# Patient Record
Sex: FEMALE | Race: WHITE | NOT HISPANIC OR LATINO | Employment: FULL TIME | URBAN - NONMETROPOLITAN AREA
[De-identification: names, ages, dates, MRNs, and addresses within clinical notes are randomized per-mention and may not be internally consistent; named-entity substitution may affect disease eponyms.]

---

## 2017-06-12 ENCOUNTER — LAB (OUTPATIENT)
Dept: LAB | Facility: HOSPITAL | Age: 47
End: 2017-06-12

## 2017-06-12 ENCOUNTER — OFFICE VISIT (OUTPATIENT)
Dept: FAMILY MEDICINE CLINIC | Facility: CLINIC | Age: 47
End: 2017-06-12

## 2017-06-12 VITALS
BODY MASS INDEX: 41.32 KG/M2 | DIASTOLIC BLOOD PRESSURE: 70 MMHG | HEIGHT: 64 IN | SYSTOLIC BLOOD PRESSURE: 138 MMHG | WEIGHT: 242 LBS

## 2017-06-12 DIAGNOSIS — R03.0 ELEVATED BLOOD PRESSURE (NOT HYPERTENSION): ICD-10-CM

## 2017-06-12 DIAGNOSIS — Z79.899 HIGH RISK MEDICATION USE: ICD-10-CM

## 2017-06-12 DIAGNOSIS — R53.83 FATIGUE, UNSPECIFIED TYPE: Primary | ICD-10-CM

## 2017-06-12 DIAGNOSIS — E66.01 MORBID OBESITY DUE TO EXCESS CALORIES (HCC): ICD-10-CM

## 2017-06-12 DIAGNOSIS — Z13.220 LIPID SCREENING: ICD-10-CM

## 2017-06-12 PROBLEM — E55.9 VITAMIN D DEFICIENCY: Status: ACTIVE | Noted: 2017-06-12

## 2017-06-12 PROBLEM — R73.03 PREDIABETES: Status: ACTIVE | Noted: 2017-06-12

## 2017-06-12 LAB
25(OH)D3 SERPL-MCNC: 16.2 NG/ML (ref 30–100)
ALBUMIN SERPL-MCNC: 4.2 G/DL (ref 3.4–4.8)
ALBUMIN/GLOB SERPL: 1.2 G/DL (ref 1.1–1.8)
ALP SERPL-CCNC: 66 U/L (ref 38–126)
ALT SERPL W P-5'-P-CCNC: 43 U/L (ref 9–52)
ANION GAP SERPL CALCULATED.3IONS-SCNC: 12 MMOL/L (ref 5–15)
ARTICHOKE IGE QN: 104 MG/DL (ref 1–129)
AST SERPL-CCNC: 30 U/L (ref 14–36)
BILIRUB SERPL-MCNC: 0.4 MG/DL (ref 0.2–1.3)
BUN BLD-MCNC: 12 MG/DL (ref 7–21)
BUN/CREAT SERPL: 14.3 (ref 7–25)
CALCIUM SPEC-SCNC: 8.8 MG/DL (ref 8.4–10.2)
CHLORIDE SERPL-SCNC: 101 MMOL/L (ref 95–110)
CHOLEST SERPL-MCNC: 149 MG/DL (ref 0–199)
CO2 SERPL-SCNC: 27 MMOL/L (ref 22–31)
CREAT BLD-MCNC: 0.84 MG/DL (ref 0.5–1)
DEPRECATED RDW RBC AUTO: 43.9 FL (ref 36.4–46.3)
ERYTHROCYTE [DISTWIDTH] IN BLOOD BY AUTOMATED COUNT: 14.8 % (ref 11.5–14.5)
GFR SERPL CREATININE-BSD FRML MDRD: 74 ML/MIN/1.73 (ref 58–135)
GLOBULIN UR ELPH-MCNC: 3.5 GM/DL (ref 2.3–3.5)
GLUCOSE BLD-MCNC: 98 MG/DL (ref 60–100)
HBA1C MFR BLD: 6.11 % (ref 4–5.6)
HCT VFR BLD AUTO: 36.2 % (ref 35–45)
HDLC SERPL-MCNC: 40 MG/DL (ref 60–200)
HGB BLD-MCNC: 12 G/DL (ref 12–15.5)
LDLC/HDLC SERPL: 2.28 {RATIO} (ref 0–3.22)
MCH RBC QN AUTO: 27.1 PG (ref 26.5–34)
MCHC RBC AUTO-ENTMCNC: 33.1 G/DL (ref 31.4–36)
MCV RBC AUTO: 81.9 FL (ref 80–98)
PLATELET # BLD AUTO: 394 10*3/MM3 (ref 150–450)
PMV BLD AUTO: 9.9 FL (ref 8–12)
POTASSIUM BLD-SCNC: 3.7 MMOL/L (ref 3.5–5.1)
PROT SERPL-MCNC: 7.7 G/DL (ref 6.3–8.6)
RBC # BLD AUTO: 4.42 10*6/MM3 (ref 3.77–5.16)
SODIUM BLD-SCNC: 140 MMOL/L (ref 137–145)
TRIGL SERPL-MCNC: 90 MG/DL (ref 20–199)
TSH SERPL DL<=0.05 MIU/L-ACNC: 2.53 MIU/ML (ref 0.46–4.68)
VIT B12 BLD-MCNC: 376 PG/ML (ref 239–931)
WBC NRBC COR # BLD: 12.18 10*3/MM3 (ref 3.2–9.8)

## 2017-06-12 PROCEDURE — 84443 ASSAY THYROID STIM HORMONE: CPT | Performed by: FAMILY MEDICINE

## 2017-06-12 PROCEDURE — 80053 COMPREHEN METABOLIC PANEL: CPT | Performed by: FAMILY MEDICINE

## 2017-06-12 PROCEDURE — 36415 COLL VENOUS BLD VENIPUNCTURE: CPT

## 2017-06-12 PROCEDURE — 80061 LIPID PANEL: CPT | Performed by: FAMILY MEDICINE

## 2017-06-12 PROCEDURE — 82306 VITAMIN D 25 HYDROXY: CPT | Performed by: FAMILY MEDICINE

## 2017-06-12 PROCEDURE — 85027 COMPLETE CBC AUTOMATED: CPT | Performed by: FAMILY MEDICINE

## 2017-06-12 PROCEDURE — 83036 HEMOGLOBIN GLYCOSYLATED A1C: CPT | Performed by: FAMILY MEDICINE

## 2017-06-12 PROCEDURE — 99203 OFFICE O/P NEW LOW 30 MIN: CPT | Performed by: FAMILY MEDICINE

## 2017-06-12 PROCEDURE — 82607 VITAMIN B-12: CPT | Performed by: FAMILY MEDICINE

## 2017-06-12 RX ORDER — HYDROCHLOROTHIAZIDE 12.5 MG/1
12.5 CAPSULE, GELATIN COATED ORAL DAILY
Qty: 30 CAPSULE | Refills: 3 | Status: SHIPPED | OUTPATIENT
Start: 2017-06-12 | End: 2017-09-07 | Stop reason: SDUPTHER

## 2017-06-12 RX ORDER — ERGOCALCIFEROL 1.25 MG/1
50000 CAPSULE ORAL
Qty: 8 CAPSULE | Refills: 0 | Status: SHIPPED | OUTPATIENT
Start: 2017-06-12 | End: 2017-12-11

## 2017-06-14 ENCOUNTER — TELEPHONE (OUTPATIENT)
Dept: FAMILY MEDICINE CLINIC | Facility: CLINIC | Age: 47
End: 2017-06-14

## 2017-06-14 NOTE — TELEPHONE ENCOUNTER
----- Message from Aziza Mccoy MD sent at 6/12/2017  7:09 PM CDT -----  Please call her and let her know that her vitamin D was very low. I will call in supplement for her to take weekly for 8 weeks.  Her thyroid looked normal.   HDL is a little low.  Can increase that with exercise or OTC fish oil/omega three.  Her blood sugars are slightly elevated.  She is prediabetic. Needs to keep work on her weight loss and eating low carb for now.  Will recheck that test in 6 months.   Follow-up as planned.

## 2017-06-14 NOTE — PROGRESS NOTES
Pr Dr. JESENIA Mccoy, Ms. Waller has been called with her recent lab results & recommendations.  Continue her current medications and follow-up as planned or sooner if any problems.

## 2017-06-20 RX ORDER — NORGESTIMATE AND ETHINYL ESTRADIOL 7DAYSX3 LO
KIT ORAL
Qty: 30 TABLET | Refills: 0 | Status: SHIPPED | OUTPATIENT
Start: 2017-06-20 | End: 2017-07-17 | Stop reason: SDUPTHER

## 2017-07-17 RX ORDER — NORGESTIMATE AND ETHINYL ESTRADIOL 7DAYSX3 LO
KIT ORAL
Qty: 30 TABLET | Refills: 0 | Status: SHIPPED | OUTPATIENT
Start: 2017-07-17 | End: 2017-08-28 | Stop reason: SDUPTHER

## 2017-07-24 ENCOUNTER — APPOINTMENT (OUTPATIENT)
Dept: LAB | Facility: HOSPITAL | Age: 47
End: 2017-07-24

## 2017-07-24 ENCOUNTER — OFFICE VISIT (OUTPATIENT)
Dept: FAMILY MEDICINE CLINIC | Facility: CLINIC | Age: 47
End: 2017-07-24

## 2017-07-24 VITALS
HEIGHT: 64 IN | DIASTOLIC BLOOD PRESSURE: 80 MMHG | WEIGHT: 230.6 LBS | BODY MASS INDEX: 39.37 KG/M2 | SYSTOLIC BLOOD PRESSURE: 125 MMHG

## 2017-07-24 DIAGNOSIS — Z12.4 ENCOUNTER FOR SCREENING FOR CERVICAL CANCER: ICD-10-CM

## 2017-07-24 DIAGNOSIS — N89.8 VAGINAL ITCHING: ICD-10-CM

## 2017-07-24 DIAGNOSIS — I10 ESSENTIAL HYPERTENSION: Primary | ICD-10-CM

## 2017-07-24 DIAGNOSIS — E66.01 MORBID OBESITY DUE TO EXCESS CALORIES (HCC): ICD-10-CM

## 2017-07-24 LAB
CANDIDA ALBICANS: NEGATIVE
GARDNERELLA VAGINALIS: NEGATIVE
TRICHOMONAS VAGINALIS PCR: NEGATIVE

## 2017-07-24 PROCEDURE — 87624 HPV HI-RISK TYP POOLED RSLT: CPT | Performed by: FAMILY MEDICINE

## 2017-07-24 PROCEDURE — 87660 TRICHOMONAS VAGIN DIR PROBE: CPT | Performed by: FAMILY MEDICINE

## 2017-07-24 PROCEDURE — 87510 GARDNER VAG DNA DIR PROBE: CPT | Performed by: FAMILY MEDICINE

## 2017-07-24 PROCEDURE — 87480 CANDIDA DNA DIR PROBE: CPT | Performed by: FAMILY MEDICINE

## 2017-07-24 PROCEDURE — 99214 OFFICE O/P EST MOD 30 MIN: CPT | Performed by: FAMILY MEDICINE

## 2017-07-24 PROCEDURE — 88142 CYTOPATH C/V THIN LAYER: CPT | Performed by: FAMILY MEDICINE

## 2017-07-24 RX ORDER — CHLORAL HYDRATE 500 MG
1000 CAPSULE ORAL
COMMUNITY
End: 2021-02-01

## 2017-07-24 NOTE — PROGRESS NOTES
Subjective   Josie Waller is a 46 y.o. female.     Hypertension   This is a new problem. The current episode started more than 1 month ago. The problem is unchanged. The problem is uncontrolled. Pertinent negatives include no anxiety, blurred vision, chest pain, headaches, malaise/fatigue, neck pain, orthopnea, palpitations, peripheral edema, PND, shortness of breath or sweats. There are no associated agents to hypertension. Risk factors for coronary artery disease include obesity. Past treatments include diuretics. The current treatment provides moderate improvement. There are no compliance problems.  There is no history of angina, kidney disease, CAD/MI, CVA, heart failure, left ventricular hypertrophy, PVD or retinopathy.   Obesity   This is a chronic problem. The current episode started more than 1 year ago. Pertinent negatives include no abdominal pain, arthralgias, chest pain, coughing, fatigue, headaches, joint swelling, nausea, neck pain, rash or vomiting.   She needs to have a pap smear also.  She had a pap smear 3 years ago and that was normal.  She hasn't had any vaginal discharge.  Last pap smear was normal. LMP was  She has had some itching on the outside of her vagina.  No dysuria.      Current Outpatient Prescriptions:   •  CETIRIZINE HCL PO, Take  by mouth., Disp: , Rfl:   •  hydrochlorothiazide (MICROZIDE) 12.5 MG capsule, Take 1 capsule by mouth Daily., Disp: 30 capsule, Rfl: 3  •  norgestimate-ethinyl estradiol (TRI-LO-SPRINTEC) 0.18/0.215/0.25 MG-25 MCG per tablet, Patient must be seen before any more refills., Disp: 30 tablet, Rfl: 0  •  Omega-3 Fatty Acids (FISH OIL) 1000 MG capsule capsule, Take 1,000 mg by mouth Daily With Breakfast., Disp: , Rfl:   •  vitamin D (ERGOCALCIFEROL) 17843 UNITS capsule capsule, Take 1 capsule by mouth Every 7 (Seven) Days., Disp: 8 capsule, Rfl: 0    The following portions of the patient's history were reviewed and updated as appropriate: allergies,  "current medications, past family history, past medical history, past social history, past surgical history and problem list.    Review of Systems   Constitutional: Negative for activity change, appetite change, fatigue, malaise/fatigue and unexpected weight change.   Eyes: Negative for blurred vision and visual disturbance.   Respiratory: Negative for cough, shortness of breath and wheezing.    Cardiovascular: Negative for chest pain, palpitations, orthopnea, leg swelling and PND.   Gastrointestinal: Negative for abdominal distention, abdominal pain, constipation, diarrhea, nausea and vomiting.   Genitourinary: Negative for dysuria, pelvic pain, vaginal bleeding, vaginal discharge and vaginal pain.   Musculoskeletal: Negative for arthralgias, back pain, gait problem, joint swelling and neck pain.   Skin: Negative for pallor, rash and wound.   Neurological: Negative for headaches.   Psychiatric/Behavioral: Negative for dysphoric mood and sleep disturbance. The patient is not nervous/anxious.        Objective    Vitals:    07/24/17 1533   BP: 152/72   Weight: 230 lb 9.6 oz (105 kg)   Height: 64\" (162.6 cm)       Physical Exam   Constitutional: She is oriented to person, place, and time. She appears well-developed and well-nourished. No distress.   Cardiovascular: Normal rate, regular rhythm and normal heart sounds.    No murmur heard.  No LE edema.   Pulmonary/Chest: Effort normal and breath sounds normal. No respiratory distress.   Abdominal: Soft. Bowel sounds are normal. She exhibits no distension. There is no tenderness.   Genitourinary: Vagina normal and uterus normal. There is rash on the right labia. There is no tenderness, lesion or injury on the right labia. There is rash on the left labia. There is no tenderness, lesion or injury on the left labia. Cervix exhibits no motion tenderness, no discharge and no friability. Right adnexum displays no mass, no tenderness and no fullness. Left adnexum displays no " mass, no tenderness and no fullness.   Genitourinary Comments: Redness on the outside of vagina on both sides.     Neurological: She is alert and oriented to person, place, and time.   Psychiatric: She has a normal mood and affect. Her behavior is normal. Judgment and thought content normal.   Nursing note and vitals reviewed.      Assessment/Plan   Problems Addressed this Visit        Cardiovascular and Mediastinum    Essential hypertension - Primary       Digestive    Morbid obesity due to excess calories    Relevant Medications    Lorcaserin HCl ER (BELVIQ XR) 20 MG tablet sustained-release 24 hour      Other Visit Diagnoses     Vaginal itching        Relevant Orders    Vaginitis / Vaginosis DNA Probe (Completed)    Encounter for screening for cervical cancer         Relevant Orders    Liquid-based Pap Smear, Screening        1.) HTN-  Will continue current medications and continue to work on weight loss.  2.) Obesity-  Will try belviq.  Continue current diet and try to add exercise into the mix.  UDS at next appointment if we continue the medication. The patient has read and signed the [facility] Controlled Substance Contract.  I will continue to see patient for regular follow up appointments.  They are well controlled on their medication.  SUREKHA has been reviewed by me and is updated every 3 months. The patient is aware of the potential for addiction and dependence.  3.) Vaginal itching- Appears to be irritation from panty liners.  Recommended that she try different brand.  Avoid scented soaps or pads.  3.) Pap smear with HPV co-testing done today.  RTC in 1 month or sooner PRN

## 2017-07-24 NOTE — PATIENT INSTRUCTIONS
Lorcaserin extended-release tablets  What is this medicine?  LORCASERIN (jono ca SER in) is used to promote and maintain weight loss in obese patients. This medicine should be used with a reduced calorie diet and, if appropriate, an exercise program.  This medicine may be used for other purposes; ask your health care provider or pharmacist if you have questions.  COMMON BRAND NAME(S): Belviq XR  What should I tell my health care provider before I take this medicine?  They need to know if you have any of these conditions:  -anatomical deformation of the penis, Peyronie's disease, or history of priapism (painful and prolonged erection)  -diabetes  -heart disease  -history of blood diseases, like sickle cell anemia or leukemia  -history of irregular heartbeat  -kidney disease  -liver disease  -suicidal thoughts, plans, or attempt; a previous suicide attempt by you or a family member  -an unusual or allergic reaction to lorcaserin, other medicines, foods, dyes, or preservatives  -pregnant or trying to get pregnant  -breast-feeding  How should I use this medicine?  Take this medicine by mouth with a glass of water. Follow the directions on the prescription label. Do not cut, crush or chew this medicine. You can take it with or without food. Take your medicine at regular intervals. Do not take it more often than directed. Do not stop taking except on your doctor's advice.  Talk to your pediatrician regarding the use of this medicine in children. Special care may be needed.  Overdosage: If you think you have taken too much of this medicine contact a poison control center or emergency room at once.  NOTE: This medicine is only for you. Do not share this medicine with others.  What if I miss a dose?  If you miss a dose, take it as soon as you can. If it is almost time for your next dose, take only that dose. Do not take double or extra doses.  What may interact with this medicine?  -cabergoline  -certain medicines for  depression, anxiety, or psychotic disturbances  -certain medicines for erectile dysfunction  -certain medicines for migraine headache like almotriptan, eletriptan, frovatriptan, naratriptan, rizatriptan, sumatriptan, zolmitriptan  -dextromethorphan  -linezolid  -lithium  -medicines for diabetes  -other weight loss products  -tramadol  -[place]quille's Wort  -stimulant medicines for attention disorders, weight loss, or to stay awake  -tryptophan  This list may not describe all possible interactions. Give your health care provider a list of all the medicines, herbs, non-prescription drugs, or dietary supplements you use. Also tell them if you smoke, drink alcohol, or use illegal drugs. Some items may interact with your medicine.  What should I watch for while using this medicine?  This medicine is intended to be used in addition to a healthy diet and appropriate exercise. The best results are achieved this way. Your doctor should instruct you to stop using this medicine if you do not lose a certain amount of weight within the first 12 weeks of treatment, but it is important that you do not change your dose in any way without consulting your doctor or health care professional. Visit your doctor or health care professional for regular checkups. Your doctor may order blood tests or other tests to see how you are doing.  Do not drive, use machinery, or do anything that needs mental alertness until you know how this medicine affects you.  This medicine may affect blood sugar levels. If you have diabetes, check with your doctor or health care professional before you change your diet or the dose of your diabetic medicine.  Patients and their families should watch out for worsening depression or thoughts of suicide. Also watch out for sudden changes in feelings such as feeling anxious, agitated, panicky, irritable, hostile, aggressive, impulsive, severely restless, overly excited and hyperactive, or not being able to sleep. If  this happens, especially at the beginning of treatment or after a change in dose, call your health care professional.  Contact your doctor or health care professional right away if you are a man with an erection that lasts longer than 4 hours or if the erection becomes painful. This may be a sign of serious problem and must be treated right away to prevent permanent damage.  What side effects may I notice from receiving this medicine?  Side effects that you should report to your doctor or health care professional as soon as possible:  -allergic reactions like skin rash, itching or hives, swelling of the face, lips, or tongue  -abnormal production of milk  -breast enlargement in both males and females  -breathing problems  -changes in emotions or moods  -changes in vision  -confusion  -erection lasting more than 4 hours or a painful erection  -fast or irregular heart beat  -feeling faint or lightheaded, falls  -fever or chills, sore throat  -hallucination, loss of contact with reality  -high or low blood pressure  -menstrual changes  -restlessness  -signs and symptoms of low blood sugar such as feeling anxious; confusion; dizziness; increased hunger; unusually weak or tired; sweating; shakiness; cold; irritable; headache; blurred vision; fast heartbeat; loss of consciousness  -slow or irregular heartbeat  -stiff muscles  -sweating  -suicidal thoughts or actions  -swelling of the ankles, feet, hands  -unusually weak or tired  -vomiting  Side effects that usually do not require medical attention (report to your doctor or health care professional if they continue or are bothersome):  -back pain  -constipation  -cough  -dry mouth  -nausea  -tiredness  This list may not describe all possible side effects. Call your doctor for medical advice about side effects. You may report side effects to FDA at 5-799-FDA-6463.  Where should I keep my medicine?  Keep out of the reach of children. This medicine can be abused. Keep your  medicine in a safe place to protect it from theft. Do not share this medicine with anyone. Selling or giving away this medicine is dangerous and against the law.  Store at room temperature between 15 and 30 degrees C (59 and 86 degrees F). Throw away any unused medicine after the expiration date.  NOTE: This sheet is a summary. It may not cover all possible information. If you have questions about this medicine, talk to your doctor, pharmacist, or health care provider.     © 2017, [employer]. (2017-01-18 16:24:54)

## 2017-07-31 LAB
LAB AP CASE REPORT: NORMAL
LAB AP GYN ADDITIONAL INFORMATION: NORMAL
LAB AP GYN OTHER FINDINGS: NORMAL
Lab: NORMAL
PATH INTERP SPEC-IMP: NORMAL
STAT OF ADQ CVX/VAG CYTO-IMP: NORMAL

## 2017-08-03 LAB — HPV I/H RISK 4 DNA CVX QL PROBE+SIG AMP: NEGATIVE

## 2017-08-28 ENCOUNTER — TELEPHONE (OUTPATIENT)
Dept: FAMILY MEDICINE CLINIC | Facility: CLINIC | Age: 47
End: 2017-08-28

## 2017-08-28 RX ORDER — NORGESTIMATE AND ETHINYL ESTRADIOL 7DAYSX3 LO
KIT ORAL
Qty: 30 TABLET | Refills: 11 | Status: SHIPPED | OUTPATIENT
Start: 2017-08-28 | End: 2018-08-14 | Stop reason: SDUPTHER

## 2017-08-28 NOTE — TELEPHONE ENCOUNTER
----- Message from Cynthia Hopper sent at 8/28/2017 11:51 AM CDT -----  Contact: BRUNA Carlton is needing refills for birth control Norgestimate Ethinyl Estradiol.  She was just seen in July for pap.    [phone]

## 2017-09-07 DIAGNOSIS — E66.01 MORBID OBESITY DUE TO EXCESS CALORIES (HCC): ICD-10-CM

## 2017-09-07 RX ORDER — HYDROCHLOROTHIAZIDE 12.5 MG/1
CAPSULE, GELATIN COATED ORAL
Qty: 30 CAPSULE | Refills: 3 | Status: SHIPPED | OUTPATIENT
Start: 2017-09-07 | End: 2018-06-11 | Stop reason: SDUPTHER

## 2017-10-09 DIAGNOSIS — E66.01 MORBID OBESITY DUE TO EXCESS CALORIES (HCC): ICD-10-CM

## 2017-10-09 RX ORDER — HYDROCHLOROTHIAZIDE 12.5 MG/1
CAPSULE, GELATIN COATED ORAL
Qty: 30 CAPSULE | Refills: 3 | Status: SHIPPED | OUTPATIENT
Start: 2017-10-09 | End: 2017-12-11 | Stop reason: SDUPTHER

## 2017-12-11 ENCOUNTER — APPOINTMENT (OUTPATIENT)
Dept: LAB | Facility: HOSPITAL | Age: 47
End: 2017-12-11

## 2017-12-11 ENCOUNTER — OFFICE VISIT (OUTPATIENT)
Dept: FAMILY MEDICINE CLINIC | Facility: CLINIC | Age: 47
End: 2017-12-11

## 2017-12-11 VITALS
DIASTOLIC BLOOD PRESSURE: 90 MMHG | HEIGHT: 64 IN | SYSTOLIC BLOOD PRESSURE: 130 MMHG | BODY MASS INDEX: 38.39 KG/M2 | WEIGHT: 224.9 LBS

## 2017-12-11 DIAGNOSIS — M79.605 LEFT LEG PAIN: ICD-10-CM

## 2017-12-11 DIAGNOSIS — I10 ESSENTIAL HYPERTENSION: Primary | ICD-10-CM

## 2017-12-11 DIAGNOSIS — E66.01 MORBID OBESITY DUE TO EXCESS CALORIES (HCC): ICD-10-CM

## 2017-12-11 DIAGNOSIS — E55.9 VITAMIN D DEFICIENCY: ICD-10-CM

## 2017-12-11 DIAGNOSIS — R73.03 PREDIABETES: ICD-10-CM

## 2017-12-11 PROBLEM — R03.0 ELEVATED BLOOD PRESSURE READING WITHOUT DIAGNOSIS OF HYPERTENSION: Status: RESOLVED | Noted: 2017-06-12 | Resolved: 2017-12-11

## 2017-12-11 LAB
25(OH)D3 SERPL-MCNC: 38.8 NG/ML (ref 30–100)
ALBUMIN SERPL-MCNC: 4.2 G/DL (ref 3.4–4.8)
ALBUMIN/GLOB SERPL: 1.1 G/DL (ref 1.1–1.8)
ALP SERPL-CCNC: 52 U/L (ref 38–126)
ALT SERPL W P-5'-P-CCNC: 28 U/L (ref 9–52)
ANION GAP SERPL CALCULATED.3IONS-SCNC: 11 MMOL/L (ref 5–15)
AST SERPL-CCNC: 26 U/L (ref 14–36)
BILIRUB SERPL-MCNC: 0.2 MG/DL (ref 0.2–1.3)
BUN BLD-MCNC: 13 MG/DL (ref 7–21)
BUN/CREAT SERPL: 15.7 (ref 7–25)
CALCIUM SPEC-SCNC: 9.3 MG/DL (ref 8.4–10.2)
CHLORIDE SERPL-SCNC: 101 MMOL/L (ref 95–110)
CO2 SERPL-SCNC: 27 MMOL/L (ref 22–31)
CREAT BLD-MCNC: 0.83 MG/DL (ref 0.5–1)
GFR SERPL CREATININE-BSD FRML MDRD: 74 ML/MIN/1.73 (ref 58–135)
GLOBULIN UR ELPH-MCNC: 3.7 GM/DL (ref 2.3–3.5)
GLUCOSE BLD-MCNC: 92 MG/DL (ref 60–100)
HBA1C MFR BLD: 5.7 % (ref 4–5.6)
POTASSIUM BLD-SCNC: 3.9 MMOL/L (ref 3.5–5.1)
PROT SERPL-MCNC: 7.9 G/DL (ref 6.3–8.6)
SODIUM BLD-SCNC: 139 MMOL/L (ref 137–145)

## 2017-12-11 PROCEDURE — 99214 OFFICE O/P EST MOD 30 MIN: CPT | Performed by: FAMILY MEDICINE

## 2017-12-11 PROCEDURE — 80053 COMPREHEN METABOLIC PANEL: CPT | Performed by: FAMILY MEDICINE

## 2017-12-11 PROCEDURE — 83036 HEMOGLOBIN GLYCOSYLATED A1C: CPT | Performed by: FAMILY MEDICINE

## 2017-12-11 PROCEDURE — 82306 VITAMIN D 25 HYDROXY: CPT | Performed by: FAMILY MEDICINE

## 2017-12-11 PROCEDURE — 36415 COLL VENOUS BLD VENIPUNCTURE: CPT | Performed by: FAMILY MEDICINE

## 2017-12-11 RX ORDER — MELATONIN
1000 2 TIMES DAILY
COMMUNITY
End: 2021-08-25

## 2017-12-11 NOTE — PROGRESS NOTES
Subjective   Josie Waller is a 47 y.o. female.     Hypertension   This is a chronic problem. The current episode started more than 1 year ago. The problem is unchanged. The problem is controlled. Associated symptoms include malaise/fatigue. Pertinent negatives include no anxiety, blurred vision, chest pain, headaches, neck pain, orthopnea, palpitations, peripheral edema, PND, shortness of breath or sweats. There are no associated agents to hypertension. Risk factors for coronary artery disease include obesity. Past treatments include diuretics. The current treatment provides moderate improvement. There are no compliance problems.  There is no history of angina, kidney disease, CAD/MI, CVA, heart failure, left ventricular hypertrophy, PVD or retinopathy.   Obesity   This is a chronic problem. The current episode started more than 1 year ago. The problem occurs daily. The problem has been gradually improving. Associated symptoms include fatigue. Pertinent negatives include no abdominal pain, anorexia, arthralgias, change in bowel habit, chest pain, chills, congestion, coughing, diaphoresis, fever, headaches, joint swelling, myalgias, nausea, neck pain, numbness, rash, sore throat, swollen glands, urinary symptoms, vertigo, visual change, vomiting or weakness. Nothing aggravates the symptoms. She has tried nothing for the symptoms. The treatment provided mild relief.     She was found to be prediabetic at her last appointment. She has been watching her diet and trying to loose weight. She has lost some on her own. She tried Belviq and that broke her out into hives.      She has had issues with her LLE. She has had redness and hair loss on the shin.  She says that leg swells a lot in her foot and below the lesion that stays shiny.  She took some vitamin d and she thinks that helped a little.  She has been taking daily and doesn't think that is helping as much as the high dose did.  Hurts when she walks and  "when she is on her feet at work.         Current Outpatient Prescriptions:   •  CETIRIZINE HCL PO, Take  by mouth., Disp: , Rfl:   •  cholecalciferol (VITAMIN D3) 1000 units tablet, Take 1,000 Units by mouth 2 (Two) Times a Day., Disp: , Rfl:   •  hydrochlorothiazide (MICROZIDE) 12.5 MG capsule, take 1 capsule by mouth once daily, Disp: 30 capsule, Rfl: 3  •  norgestimate-ethinyl estradiol (TRI-LO-SPRINTEC) 0.18/0.215/0.25 MG-25 MCG per tablet, Patient must be seen before any more refills., Disp: 30 tablet, Rfl: 11  •  Omega-3 Fatty Acids (FISH OIL) 1000 MG capsule capsule, Take 1,000 mg by mouth Daily With Breakfast., Disp: , Rfl:     The following portions of the patient's history were reviewed and updated as appropriate: allergies, current medications, past family history, past medical history, past social history, past surgical history and problem list.    Review of Systems   Constitutional: Positive for fatigue and malaise/fatigue. Negative for activity change, appetite change, chills, diaphoresis, fever and unexpected weight change.   HENT: Negative for congestion and sore throat.    Eyes: Negative for blurred vision and visual disturbance.   Respiratory: Negative for cough, shortness of breath and wheezing.    Cardiovascular: Positive for leg swelling. Negative for chest pain, palpitations, orthopnea and PND.   Gastrointestinal: Negative for abdominal distention, abdominal pain, anorexia, change in bowel habit, constipation, diarrhea, nausea and vomiting.   Musculoskeletal: Negative for arthralgias, joint swelling, myalgias and neck pain.   Skin: Negative for pallor, rash and wound.   Neurological: Negative for vertigo, weakness, numbness and headaches.   Psychiatric/Behavioral: Negative for dysphoric mood and sleep disturbance. The patient is not nervous/anxious.        Objective    Vitals:    12/11/17 1302   BP: 142/96   Weight: 102 kg (224 lb 14.4 oz)   Height: 162.6 cm (64\")       Physical Exam "   Constitutional: She is oriented to person, place, and time. She appears well-developed and well-nourished. No distress.   Cardiovascular: Normal rate, regular rhythm and normal heart sounds.    No murmur heard.  No LE edema.   Pulmonary/Chest: Effort normal and breath sounds normal. No respiratory distress.   Abdominal: Soft. Bowel sounds are normal. She exhibits no distension. There is no tenderness.   Neurological: She is alert and oriented to person, place, and time.   Skin:   Skin on the anterior aspect of her left lower leg is shiny, has lost all hair growth and is cold to touch.  Very tender also.  Only trace swelling on that lower extremity   Psychiatric: She has a normal mood and affect. Her behavior is normal. Judgment and thought content normal.   Nursing note and vitals reviewed.      Assessment/Plan   Problems Addressed this Visit        Cardiovascular and Mediastinum    Essential hypertension - Primary    Relevant Orders    Comprehensive Metabolic Panel (Completed)       Digestive    Morbid obesity due to excess calories    Vitamin D deficiency    Relevant Orders    Vitamin D 25 hydroxy (Completed)       Other    Prediabetes    Relevant Orders    Hemoglobin A1c (Completed)      Other Visit Diagnoses     Left leg pain        Relevant Orders    Doppler Ankle Brachial Index Multi Level CAR        1.) HTN-  Will continue HCTZ. COntinue to work on weight loss.  Will check CMP today.  2.) Obesity- Discussed with her other options. She had allergic reaction to Belviq and had to stop it.  She would like to try Suxenda.  Showed her how to use it. Discussed how to titrate it up. Gave her sample and showed her how to titrate it up weekly.  Gave her first injection today in the office. Will try to get PA to help her pay for it.    3.) Prediabetes-  She has been working on her sugar and carb intake.  Will recheck A1C today.  4.) Leg Pain-  I am concerned for poor circulation on the LLE.  She has had loss of hair  on sensation at times. Doesn't appear to be venous.  I am concerned that this is arterial issue.  Will try to get ASHLEE.  May refer to wound care.  Recheck vitamin D levels also. She isn't sure if that helped the pain or not.    RTC in 2 months or sooner PRN

## 2017-12-13 ENCOUNTER — TELEPHONE (OUTPATIENT)
Dept: FAMILY MEDICINE CLINIC | Facility: CLINIC | Age: 47
End: 2017-12-13

## 2017-12-20 ENCOUNTER — TELEPHONE (OUTPATIENT)
Dept: FAMILY MEDICINE CLINIC | Facility: CLINIC | Age: 47
End: 2017-12-20

## 2017-12-20 NOTE — TELEPHONE ENCOUNTER
Recommendations Relayed to ms. Waller.  She will try the Compression Stockings and see how it goes.  If no improvement she will discuss at there next visit or see you sooner.

## 2017-12-20 NOTE — TELEPHONE ENCOUNTER
Pr Dr. JESENIA Mccoy, Ms. Waller has been called with her recent ASHLEE Results results & recommendations.  Continue her current medications and follow-up as planned or sooner if any problems.      Patient Concern:  Since her ASHLEE's were Normal.  She wants to know what needs to be done now, she states she is still having the pain??  Please Advise      ----- Message from Aziza Mccoy MD sent at 12/20/2017 12:45 PM CST -----  Please let her know that her ASHLEE was normal.

## 2017-12-20 NOTE — TELEPHONE ENCOUNTER
-Pr Dr. JESENIA Mccoy, Ms. Waller has been called with her recent ASHLEE Results results & recommendations.  Continue her current medications and follow-up as planned or sooner if any problems.      ---- Message from Aziza Mccoy MD sent at 12/20/2017 12:45 PM CST -----  Please let her know that her ASHLEE was normal.

## 2017-12-28 ENCOUNTER — PRIOR AUTHORIZATION (OUTPATIENT)
Dept: FAMILY MEDICINE CLINIC | Facility: CLINIC | Age: 47
End: 2017-12-28

## 2017-12-29 ENCOUNTER — TELEPHONE (OUTPATIENT)
Dept: FAMILY MEDICINE CLINIC | Facility: CLINIC | Age: 47
End: 2017-12-29

## 2018-01-19 ENCOUNTER — TELEPHONE (OUTPATIENT)
Dept: FAMILY MEDICINE CLINIC | Facility: CLINIC | Age: 48
End: 2018-01-19

## 2018-01-19 RX ORDER — SCOLOPAMINE TRANSDERMAL SYSTEM 1 MG/1
1 PATCH, EXTENDED RELEASE TRANSDERMAL
Qty: 5 PATCH | Refills: 1 | Status: SHIPPED | OUTPATIENT
Start: 2018-01-19 | End: 2021-02-01

## 2018-06-11 DIAGNOSIS — E66.01 MORBID OBESITY DUE TO EXCESS CALORIES (HCC): ICD-10-CM

## 2018-06-11 RX ORDER — HYDROCHLOROTHIAZIDE 12.5 MG/1
CAPSULE, GELATIN COATED ORAL
Qty: 30 CAPSULE | Refills: 0 | Status: SHIPPED | OUTPATIENT
Start: 2018-06-11 | End: 2021-02-01

## 2018-08-14 RX ORDER — NORGESTIMATE AND ETHINYL ESTRADIOL 7DAYSX3 LO
KIT ORAL
Qty: 30 TABLET | Refills: 0 | Status: SHIPPED | OUTPATIENT
Start: 2018-08-14 | End: 2018-08-15 | Stop reason: SDUPTHER

## 2018-08-15 RX ORDER — NORGESTIMATE AND ETHINYL ESTRADIOL 7DAYSX3 LO
KIT ORAL
Qty: 30 TABLET | Refills: 0 | Status: SHIPPED | OUTPATIENT
Start: 2018-08-15 | End: 2018-09-17 | Stop reason: SDUPTHER

## 2018-09-17 RX ORDER — NORGESTIMATE AND ETHINYL ESTRADIOL 7DAYSX3 LO
KIT ORAL
Qty: 30 TABLET | Refills: 1 | Status: SHIPPED | OUTPATIENT
Start: 2018-09-17 | End: 2018-09-26 | Stop reason: SDUPTHER

## 2018-09-26 ENCOUNTER — PROCEDURE VISIT (OUTPATIENT)
Dept: OBSTETRICS AND GYNECOLOGY | Facility: CLINIC | Age: 48
End: 2018-09-26

## 2018-09-26 VITALS
SYSTOLIC BLOOD PRESSURE: 151 MMHG | HEIGHT: 66 IN | DIASTOLIC BLOOD PRESSURE: 80 MMHG | WEIGHT: 234.8 LBS | BODY MASS INDEX: 37.73 KG/M2

## 2018-09-26 DIAGNOSIS — N95.1 PERIMENOPAUSAL: ICD-10-CM

## 2018-09-26 DIAGNOSIS — Z30.41 SURVEILLANCE OF CONTRACEPTIVE PILL: Primary | ICD-10-CM

## 2018-09-26 DIAGNOSIS — N39.3 STRESS INCONTINENCE: ICD-10-CM

## 2018-09-26 PROCEDURE — 99214 OFFICE O/P EST MOD 30 MIN: CPT | Performed by: NURSE PRACTITIONER

## 2018-09-26 RX ORDER — TIZANIDINE HYDROCHLORIDE 4 MG/1
4 CAPSULE, GELATIN COATED ORAL 2 TIMES DAILY
COMMUNITY
Start: 2018-07-16 | End: 2019-07-17

## 2018-09-26 RX ORDER — FLUTICASONE PROPIONATE 50 MCG
1 SPRAY, SUSPENSION (ML) NASAL DAILY
COMMUNITY
End: 2021-02-01

## 2018-09-26 RX ORDER — NORGESTIMATE AND ETHINYL ESTRADIOL 7DAYSX3 LO
1 KIT ORAL DAILY
Qty: 84 TABLET | Refills: 4 | Status: SHIPPED | OUTPATIENT
Start: 2018-09-26 | End: 2020-07-13

## 2018-09-26 NOTE — PROGRESS NOTES
Subjective   Josie Waller is a 47 y.o. female. Here for refills on OCPs and wonders if she may be perimenopausal. Also has c/o leaking urine frequently.    LMP-   Last pap- 17 normal with negative HPV  Last mammo- 18 WNL    Taking Tri-Lo-Sprintec for contraception and has no complaints with it. States that for several months now her periods will start on Thursday or Friday and for the whole time she's been on this pill, they have always started on Tuesday; she's just concerned that this is not normal.     Reports occasional night time sweats but not saturated when she wakes. Does have trouble staying asleep and feels a little more perez than usual. Denies hot flashes.      Gynecologic Exam   The patient's pertinent negatives include no genital itching, genital lesions, genital odor, genital rash, missed menses, pelvic pain, vaginal bleeding or vaginal discharge. Pertinent negatives include no abdominal pain, constipation, diarrhea, dysuria, headaches, nausea, rash, urgency or vomiting. She is sexually active. No, her partner does not have an STD. She uses oral contraceptives for contraception. Her menstrual history has been regular. Her past medical history is significant for a  section. There is no history of an abdominal surgery, an ectopic pregnancy, endometriosis, a gynecological surgery, herpes simplex, menorrhagia, metrorrhagia, miscarriage, ovarian cysts, perineal abscess, PID, an STD, a terminated pregnancy or vaginosis.       The following portions of the patient's history were reviewed and updated as appropriate: allergies, current medications, past medical history, past social history, past surgical history and problem list.    Review of Systems   Constitutional: Negative for activity change, appetite change, diaphoresis, fatigue and unexpected weight change.   Respiratory: Negative for chest tightness and shortness of breath.    Cardiovascular: Negative for chest pain,  palpitations and leg swelling.   Gastrointestinal: Negative for abdominal distention, abdominal pain, blood in stool, constipation, diarrhea, nausea and vomiting.   Endocrine: Negative for cold intolerance, heat intolerance, polydipsia, polyphagia and polyuria.   Genitourinary: Negative for difficulty urinating, dyspareunia, dysuria, genital sores, menorrhagia, menstrual problem, missed menses, pelvic pain, urgency, vaginal bleeding, vaginal discharge and vaginal pain.   Musculoskeletal: Negative for gait problem and myalgias.   Skin: Negative for color change, pallor and rash.   Neurological: Negative for dizziness, weakness, light-headedness and headaches.   Hematological: Negative for adenopathy.   Psychiatric/Behavioral: Positive for sleep disturbance. Negative for agitation, confusion and dysphoric mood. The patient is not nervous/anxious.        Objective   Physical Exam   Constitutional: She is oriented to person, place, and time. She appears well-developed and well-nourished. No distress.   Neck: No thyromegaly present.   Cardiovascular: Normal rate, regular rhythm and normal heart sounds.    Pulmonary/Chest: Effort normal and breath sounds normal.   Neurological: She is alert and oriented to person, place, and time.   Skin: Skin is warm, dry and intact. Capillary refill takes less than 2 seconds. She is not diaphoretic.   Psychiatric: She has a normal mood and affect. Her behavior is normal.   Nursing note and vitals reviewed.      Assessment/Plan   Josie was seen today for menstrual problem and urinary incontinence.    Diagnoses and all orders for this visit:    Stress incontinence  -     Ambulatory Referral to Physical Therapy Evaluate and treat, Women's Health, Pelvic Floor    Perimenopausal    Surveillance of contraceptive pill    Other orders  -     norgestimate-ethinyl estradiol (TRI-LO-SPRINTEC) 0.18/0.215/0.25 MG-25 MCG per tablet; Take 1 tablet by mouth Daily.     Continue OCPs. Likely  perimenopausal based on symptoms but pt does not want to stop OCPs to test hormone levels. Encouraged her to empty bladder as frequently as possible during her work day. Will refer to PFPT for eval and treatment of stress UI.

## 2018-10-01 ENCOUNTER — HOSPITAL ENCOUNTER (OUTPATIENT)
Dept: PHYSICAL THERAPY | Facility: HOSPITAL | Age: 48
Setting detail: THERAPIES SERIES
Discharge: HOME | End: 2018-10-01

## 2018-10-01 DIAGNOSIS — N39.3 SUI (STRESS URINARY INCONTINENCE, FEMALE): Primary | ICD-10-CM

## 2018-10-01 PROCEDURE — 97162 PT EVAL MOD COMPLEX 30 MIN: CPT | Performed by: PHYSICAL THERAPIST

## 2018-10-01 NOTE — THERAPY EVALUATION
Outpatient Physical Therapy Women's Health Initial Evaluation  [facility]     Patient Name: Josie Waller  : 1970  MRN: [id number]  Today's Date: 10/1/2018        Visit Date: 10/01/2018  Visit Number:   Recheck: 18  Insurance: pending authorization    Patient Active Problem List   Diagnosis   • Morbid obesity due to excess calories (CMS/[facility])   • Vitamin D deficiency   • Prediabetes   • Essential hypertension   • Perimenopausal   • Surveillance of contraceptive pill        Past Medical History:   Diagnosis Date   • Backache    • Contact dermatitis     from hair dye      • Encounter for gynecological examination (general) (routine) without abnormal findings    • Encounter for surveillance of contraceptive pills    • History of acute bronchitis    • History of mammography, screening 2016   • Other disorders of menstruation and other abnormal bleeding from female genital tract    • Pain in left lower leg     questionable stress fx      • Palpitations    • Paresthesia    • Paresthesia of lower extremity     right thigh      • Unspecified Symptom associated with female genital organs    • Upper respiratory infection    • Visit for gynecologic examination         Past Surgical History:   Procedure Laterality Date   •  SECTION  07/15/2014    Fetal distress, primary  section.   • EAR TUBES      as a child   • INJECTION OF MEDICATION  2016    Kenalog (2)    • INJECTION OF MEDICATION  2011    Solu-Medrol (1)    • PAP SMEAR  2011    Negative   • TONSILLECTOMY           Visit Dx:    ICD-10-CM ICD-9-CM   1. VAHID (stress urinary incontinence, female) N39.3 625.6                 Women's Health     Row Name 10/01/18 1000             Subjective Comments    Subjective Comments I have noticed that if she sneezes, blinks, coughs I leak for approx 1.5 to 2  years.  I don't feel comfortable not wearing protection.  Uses Always discretes pads usually all day and  all night.  Difficulty in holding her bladder when urge occurs.  Sometimes doesn't feel that she completes emptying of her bladder.  Little leak on ocassion with general activity but usually with stress induced activity.  Hairdresser by trade.  Void schedule can vary quite a bit by schedule.  Waking day 6-8 per day.  Nocturia some, but not every night.  No hesitancy with void start.  Double void some in history.  Not aware of any prolapse that she is aware.  Bowel activity normal daily frequency.  [place] stool average of 4/5 regularly.  No real straining that she recalls.  Intake: coffee, water, tea/soda.  Food: some intake of fruits and veggies per day, but could be better.   -SW         Pregnancy Questions    Number of Pregnancies 1  -SW      Number of Children 1  -SW      Type of Previous Deliveries    emergency   -SW         Subjective Pain    Able to rate subjective pain? yes  -SW      Pre-Treatment Pain Level 0  -SW      Post-Treatment Pain Level 0  -SW         Pelvic Floor Muscle    Strength (Right) 3: Squeeze with/without lift   posterior wall lift but weakened laterally  -SW      Strength (Left) 3: Squeeze with/without lift  -SW      Symmetry of Sustained Maximal Contraction Symmetrical  -SW      Endurance (Ability to Hold Maximal Contraction) 3 sec  -SW      # of Reps of Maximal Contractions while Maintaining Endurance and Strength 4 sets  -SW      Fast Contraction (# of 1 sec contractions performed) 7  -SW      Interior Muscle Comments No tenderness noted with palpation.  Intravaginally noted to have + rugae with palpation.  No prolapse detected or noted.  [place] position in normal alignment.   -SW         Perineal Observation    Perineal Observation Performed? Yes  -SW         Observation of Contraction in Perineum    Anal [place] Present  -SW      Perineal Body Lift Present  -SW         Pelvic Floor    Ability to Isolate Contraction of Pelvic Floor No  -SW      Overflow from Adjacent  Muscles Gluteus [place]hawn  -SW         Cough    Bulge Yes  -SW         SEMG Evaluation    Pelvic Floor Electrode Internal Vaginal  -SW      Baseline Resting Yes  -SW      SEMG Evaluation Comments Normal baseline achieved with supine SEMG assessment of PF.  Able to isolated fairly well with verbal cues the PF for activation with amplitudes varying from 7-10mv.  Endurance fair at best avg of 3 sec prior to 50% reduction of activation tone.   -SW         Education Provided On:    Education Points HEP;Other (comment)   bladder diary  -SW      Method of Delivery Verbal;Demonstration;Written  -SW      Education Provided To Patient  -SW      Level of Understanding Teach back education performed;Verbalized;Demonstrated  -SW      HEP Comments bladder diary completion x 3 days.  Begin to isolation PF with practice contraction.    -SW        User Key  (r) = Recorded By, (t) = Taken By, (c) = Cosigned By    Initials Name Provider Type    Mary Belcher, PT Physical Therapist                             PT Assessment/Plan     Row Name 10/01/18 1000          PT Assessment    Functional Limitations Performance in work activities;Performance in leisure activities;Performance in self-care ADL  -SW     Impairments Impaired muscle power;Impaired muscle endurance;Muscle strength  -SW     Assessment Comments Patient is a 48 yo female presenting to clinic with complaints of VAHID x 1.5 to 2 year onset.  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  Patient demonstrates a + self awareness of PF activity, but tends to compensate with gluteal muscles.  She would be a great candidate for skilled therapy intervention to improve QOL and improve bladder control.    -SW     Rehab Potential Good  -SW     Patient/caregiver participated in establishment of treatment plan and goals Yes  -SW     Patient would benefit from skilled therapy intervention Yes  -SW        PT Plan    PT  Frequency 1x/week  -     Predicted Duration of Therapy Intervention (Therapy Eval) 6-10 visits  -     PT Plan Comments Bladder diary completion, behavioral management training, PF uptraining with endurance.  Core stabilization as needed to assist with stability to core with activities.  -       User Key  (r) = Recorded By, (t) = Taken By, (c) = Cosigned By    Initials Name Provider Type    Mary Belcher, PT Physical Therapist                  Exercises     Row Name 10/01/18 1000             Subjective Comments    Subjective Comments I have noticed that if she sneezes, blinks, coughs I leak for approx 1.5 to 2  years.  I don't feel comfortable not wearing protection.  Uses Always discretes pads usually all day and all night.  Difficulty in holding her bladder when urge occurs.  Sometimes doesn't feel that she completes emptying of her bladder.  Little leak on ocassion with general activity but usually with stress induced activity.  Hairdresser by trade.  Void schedule can vary quite a bit by schedule.  Waking day 6-8 per day.  Nocturia some, but not every night.  No hesitancy with void start.  Double void some in history.  Not aware of any prolapse that she is aware.  Bowel activity normal daily frequency.  [place] stool average of 4/5 regularly.  No real straining that she recalls.  Intake: coffee, water, tea/soda.  Food: some intake of fruits and veggies per day, but could be better.   -         Subjective Pain    Able to rate subjective pain? yes  -SW      Pre-Treatment Pain Level 0  -SW      Post-Treatment Pain Level 0  -SW         Exercise 1    Exercise Name 1 PF education  -      Additional Comments Anatomy and functions described for PF.   -         Exercise 2    Exercise Name 2 Bladder dairy education  -      Additional Comments Educated with expected completion x 3 days by next visit of PT.   -         Exercise 3    Exercise Name 3 isolated PF contractions   -      Additional  Comments Dec gluteal activity with mod verbal cues for performance.   -        User Key  (r) = Recorded By, (t) = Taken By, (c) = Cosigned By    Initials Name Provider Type    Mary Belcher, PT Physical Therapist                            PT OP Goals     Row Name 10/01/18 1600          PT Short Term Goals    STG Date to Achieve 11/01/18  -     STG 1 patient to complete bladder diary x 3 consecutive visits.   -     STG 1 Progress New  -     STG 2 patient to demonstrate tolieting position for improved evacuation of bladder to effectively eliminate bladder without need for double voids.   -     STG 2 Progress New  -     STG 3 Patient to demonstrate coordination of PF to perform 10 QF in isolation with full return to baseline in seated position.  -     STG 3 Progress New  -     STG 4 Patient to begin void schedules with 75% success at every 3 hour interval voids during waking day.   -     STG 4 Progress New  -     STG 5 patient to inc endurance of PF such that she is able to sustain contraction supine x 7 sec with good stability without accessory muscle recruitment.   -     STG 5 Progress New  [facility]        Long Term Goals    LTG Date to Achieve 02/01/19  -     LTG 1 PFDI SF 20 equal to 20% or less disability implying improved QOL.   -     LTG 1 Progress New  -     LTG 2 Patient to report complete emptying of bladder without evidence of double void occurence.   -     LTG 2 Progress New  -     LTG 3 Patient to inc confidence of bladder control that she eliminates pads at night, and potentially in the day.   -     LTG 3 Progress New  -     LTG 4 Patient to demonstrate coordination to PF to allow for SEMG with seated and standing postures such that she is able to activate in isolation with amplitudes up to 7mv or > with full return to baseline x 10 reps and/or endurance of 10 sec x 10 reps with good stability of PF.   -     LTG 4 Progress New  [facility]        Time Calculation    PT  Goal Re-Cert Due Date 11/01/18  -MITCH       User Key  (r) = Recorded By, (t) = Taken By, (c) = Cosigned By    Initials Name Provider Type    Mary Belcher PT Physical Therapist          Therapy Education  Given: Other (comment) (bladder diary completion)  Program: New  How Provided: Verbal, Demonstration, Written  Provided to: Patient  Level of Understanding: Teach back education performed, Verbalized, Demonstrated     Outcome Measure Options: Other Outcome Measure  Other Outcome Measure Tool Used  Other Outcome Measure Tool Comments: PFDI SF20=66.7; POPDI6=16.7, CRAD8=0, UDI6=50      Time Calculation:   Start Time: 1013  Stop Time: 1120  Time Calculation (min): 67 min  Therapy Suggested Charges     Code   Minutes Charges    None           Therapy Charges for Today     Code Description Service Date Service Provider Modifiers Qty    59905042815 HC PT THER SUPP EA 15 MIN 10/1/2018 Mary Arita, PT GP 1    11323406363 HC PT EVAL MOD COMPLEXITY 4 10/1/2018 Mary Arita, PT GP 1          PT G-Codes  Outcome Measure Options: Other Outcome Measure       Mary Arita PT  10/1/2018

## 2018-10-08 ENCOUNTER — HOSPITAL ENCOUNTER (OUTPATIENT)
Dept: PHYSICAL THERAPY | Facility: HOSPITAL | Age: 48
Setting detail: THERAPIES SERIES
Discharge: HOME | End: 2018-10-08

## 2018-10-08 DIAGNOSIS — N39.3 SUI (STRESS URINARY INCONTINENCE, FEMALE): Primary | ICD-10-CM

## 2018-10-08 PROCEDURE — 97112 NEUROMUSCULAR REEDUCATION: CPT | Performed by: PHYSICAL THERAPIST

## 2018-10-08 NOTE — THERAPY TREATMENT NOTE
Outpatient Physical Therapy Women's Health Treatment Note  [facility]     Patient Name: Josie Waller  : 1970  MRN: [id number]  Today's Date: 10/8/2018        Visit Date: 10/08/2018  Visit Number:   Recheck: 18  Insurance: 10 visits (18)    Visit Dx:    ICD-10-CM ICD-9-CM   1. VAHID (stress urinary incontinence, female) N39.3 625.6       Patient Active Problem List   Diagnosis   • Morbid obesity due to excess calories (CMS/[facility])   • Vitamin D deficiency   • Prediabetes   • Essential hypertension   • Perimenopausal   • Surveillance of contraceptive pill              Women's Health     Row Name 10/08/18 1100             Pregnancy Questions    Number of Pregnancies 1  -SW      Number of Children 1  -SW      Type of Previous Deliveries    emergency   -SW         Subjective Pain    Able to rate subjective pain? yes  -SW      Pre-Treatment Pain Level 0  -SW      Post-Treatment Pain Level 0  -SW         Pelvic Floor Muscle    Symmetry of Sustained Maximal Contraction --  -SW      Endurance (Ability to Hold Maximal Contraction) --  -SW      # of Reps of Maximal Contractions while Maintaining Endurance and Strength --  -SW      Fast Contraction (# of 1 sec contractions performed) --  -SW         Perineal Observation    Perineal Observation Performed? --  -SW         Observation of Contraction in Perineum    Anal Latta --  -SW      Perineal Body Lift --  -SW         Pelvic Floor    Ability to Isolate Contraction of Pelvic Floor --  -SW      Overflow from Adjacent Muscles --  -SW         Cough    Bulge --  -SW         Education Provided On:    Education Points HEP;Behavioral modifications;Information on dietary irritants to bladder/bowels  -SW      Method of Delivery Verbal;Demonstration;Written  -SW      Education Provided To Patient  -      Level of Understanding Teach back education performed;Verbalized;Demonstrated  -      HEP Comments Begin voids every 2 hours.  Improve hydration to 90oz/day with 60oz good and 30 oz irritant.    -SW        User Key  (r) = Recorded By, (t) = Taken By, (c) = Cosigned By    Initials Name Provider Type    Mary Belcher PT Physical Therapist              PT Ortho     Row Name 10/08/18 1100       Subjective Comments    Subjective Comments Patient reported that she completed bladder diary as requested.  She found that she urinated a lot more than she thought.  She didn't have many leaks that she noted.    -SW       Posture/Observations    Posture- WNL Posture is WNL  -SW    Posture/Observations Comments Bladder diary presented this date for review.  Charting filled out with 100% accuracy.    -SW      User Key  (r) = Recorded By, (t) = Taken By, (c) = Cosigned By    Initials Name Provider Type    Mary Belcher PT Physical Therapist        Bladder and/or Bowel Diary was reviewed this date and education completed as follows based on objective information given in diary.    Normal bladder and bowel anatomy was introduced as to better understand their function.  The normal voiding range was given with average of 6-8 times during a 24 hour period, with average of nocturia up to 1 per night post menopausal.  Urine stream was discussed as to not force, push, or strain to initiate or empty the flow of urine.  Proper toileting was reviewed as to improve overall evacuation for bladder and bowel systems.  Patient demonstrated this technique via verbal and demonstrative techniques shown by the therapist.  Three primary functions of the pelvic floor were discussed to include 1) sexual appreciation, 2) support of internal structures, and 3) sphincteric control.  Additionally, normal bladder and bowel function was discussed and patient's values, as per his/her diary, were presented and compared to that of normal function.    Patient's diary reveals 7-11 voids total per day with 0 average episodes of nocturia per night.  Patient void schedule  varied with increments of 1-2 hour increments.  Patient presented with 0-2 episodes of leakage per day.    Patient's level of urgency ranged from 1-2 in terms of strength with 1=min urge, 2=moderate urge and 3=strong urge.  Patient received education on the urge signal that one feels as the bladder wall stretches to fill with urine.  Three stage scale given with descriptor of the stages identified as stated above.  Urge suppression techniques to help control the urge and behaviorally manage urination and/or bowel schedule were taught during this time.    Good fluid intake was discussed as to aid in the promotion of good bladder health.  Hydration formula using body weight guide was used to calculate potential need for full hydration.  Per the bladder review, the patient consumed 62-72 total fluid oz per day with majority oz consisting of irritating fluid.  Per the formula, a hydration goal of 90 oz per day with 30 oz irritant fluids based upon body weight formula.    Dietary intake was discussed this visit as to how it too can affect the overall bladder health.  Handout was issued, as well as thorough discussion given as to irritants to bladder and how to categorize those irritants to improve overall bladder health. Suggestions were given based on response of patient's dietary intake per diary.    Toileting position demonstrated with teach back of patient.  Discussed Kegel x 5 post void to ensure sufficient emptying.                 PT Assessment/Plan     Row Name 10/08/18 1100          PT Assessment    Functional Limitations Performance in work activities;Performance in leisure activities;Performance in self-care ADL  -SW     Impairments Impaired muscle power;Impaired muscle endurance;Muscle strength  -SW     Assessment Comments Patient verbalized dietary irritants as presented this date.  She consumes a vast amount of irritants through the day which consumes a high volume of overall intake.  Patient trying to  utilize muscle contractions as well to inc sphincter closure.  Still a challenge with endurance activities.   -SW     Rehab Potential Good  -     Patient/caregiver participated in establishment of treatment plan and goals Yes  -SW     Patient would benefit from skilled therapy intervention Yes  -        PT Plan    PT Frequency 1x/week  -     Predicted Duration of Therapy Intervention (Therapy Eval) 6-10 visits  -     PT Plan Comments Begin bladder retraining advancements.  Start HEP assignment next visit.    -       User Key  (r) = Recorded By, (t) = Taken By, (c) = Cosigned By    Initials Name Provider Type    Mary Belcher, PT Physical Therapist                      Exercises     Row Name 10/08/18 1100             Subjective Comments    Subjective Comments Patient reported that she completed bladder diary as requested.  She found that she urinated a lot more than she thought.  She didn't have many leaks that she noted.    -         Subjective Pain    Able to rate subjective pain? yes  -SW      Pre-Treatment Pain Level 0  -SW      Post-Treatment Pain Level 0  -SW         Exercise 1    Exercise Name 1 Behavioral managment education.  -      Additional Comments See details for neuromuscular reeducation and behavioral training methods taught this visit.   -        User Key  (r) = Recorded By, (t) = Taken By, (c) = Cosigned By    Initials Name Provider Type    Mary Belcher, PT Physical Therapist                            PT OP Goals     Row Name 10/08/18 1100          PT Short Term Goals    STG Date to Achieve 11/01/18  -     STG 1 patient to complete bladder diary x 3 consecutive visits.   -     STG 1 Progress Met  -     STG 2 patient to demonstrate tolieting position for improved evacuation of bladder to effectively eliminate bladder without need for double voids.   -     STG 2 Progress New  -     STG 3 Patient to demonstrate coordination of PF to perform 10 QF in  isolation with full return to baseline in seated position.  -     STG 3 Progress New  -     STG 4 Patient to begin void schedules with 75% success at every 3 hour interval voids during waking day.   -     STG 4 Progress New  -[facility] 5 patient to inc endurance of PF such that she is able to sustain contraction supine x 7 sec with good stability without accessory muscle recruitment.   -[facility] 5 Progress New  [facility]        Long Term Goals    LTG Date to Achieve 02/01/19  -     LTG 1 PFDI SF 20 equal to 20% or less disability implying improved QOL.   -     LTG 1 Progress New  -     LTG 2 Patient to report complete emptying of bladder without evidence of double void occurence.   -     LTG 2 Progress New  -     LTG 3 Patient to inc confidence of bladder control that she eliminates pads at night, and potentially in the day.   -     LTG 3 Progress New  -     LTG 4 Patient to demonstrate coordination to PF to allow for SEMG with seated and standing postures such that she is able to activate in isolation with amplitudes up to 7mv or > with full return to baseline x 10 reps and/or endurance of 10 sec x 10 reps with good stability of PF.   -     LTG 4 Progress New  [facility]        Time Calculation    PT Goal Re-Cert Due Date 11/01/18  -       User Key  (r) = Recorded By, (t) = Taken By, (c) = Cosigned By    Initials Name Provider Type    Mary Belcher, PT Physical Therapist           Therapy Education  Given: Other (comment) (behavioral management techniques. )  Program: New  How Provided: Verbal, Demonstration, Written  Provided to: Patient  Level of Understanding: Teach back education performed, Verbalized, Demonstrated              Time Calculation:   Start Time: 1115  Stop Time: 1221  Time Calculation (min): 66 min  Therapy Suggested Charges     Code   Minutes Charges    None           Therapy Charges for Today     Code Description Service Date Service Provider Modifiers Qty    [id number]  HC PT NEUROMUSC RE EDUCATION EA 15 MIN 10/8/2018 Mary Arita, PT GP 4                    Mary Arita, PT  10/8/2018

## 2018-10-22 ENCOUNTER — HOSPITAL ENCOUNTER (OUTPATIENT)
Dept: PHYSICAL THERAPY | Facility: HOSPITAL | Age: 48
Setting detail: THERAPIES SERIES
Discharge: HOME | End: 2018-10-22

## 2018-10-22 DIAGNOSIS — N39.3 SUI (STRESS URINARY INCONTINENCE, FEMALE): Primary | ICD-10-CM

## 2018-10-22 PROCEDURE — 97110 THERAPEUTIC EXERCISES: CPT | Performed by: PHYSICAL THERAPIST

## 2018-10-22 NOTE — THERAPY TREATMENT NOTE
Outpatient Physical Therapy Women's Health Treatment Note  [facility]     Patient Name: Josie Waller  : 1970  MRN: [id number]  Today's Date: 10/22/2018        Visit Date: 10/22/2018  Visit Number: 3/3  Recheck: 18  Insurance: 10 visits (18)  % improvement: 40%    Visit Dx:    ICD-10-CM ICD-9-CM   1. VAHID (stress urinary incontinence, female) N39.3 625.6       Patient Active Problem List   Diagnosis   • Morbid obesity due to excess calories (CMS/[facility])   • Vitamin D deficiency   • Prediabetes   • Essential hypertension   • Perimenopausal   • Surveillance of contraceptive pill              Women's Health     Row Name 10/22/18 0900             Subjective Comments    Subjective Comments I am trying to stretch the void schedule out as I can.  Work has made that difficult.  Between 2.5 to 3 hours.  One or two leaks (small) since last being seen.  Trying to watch fluids and dec irritants.  Total volume is still a little less than desired but overal intake of irritant is reduced.  Has defintely seen some improvement in urgency associated with bladder.  She feels she is making progress for sure even early on in this process.   -SW         Pregnancy Questions    Number of Pregnancies 1  -SW      Number of Children 1  -SW      Type of Previous Deliveries    emergency   -SW         Subjective Pain    Post-Treatment Pain Level 0  -SW         Pelvic Floor Muscle    Strength (Right) 3: Squeeze with/without lift  -SW      Strength (Left) 3: Squeeze with/without lift  -SW      Symmetry of Sustained Maximal Contraction Symmetrical  -SW      Endurance (Ability to Hold Maximal Contraction) 10 sec  -SW      # of Reps of Maximal Contractions while Maintaining Endurance and Strength 5 sets  -SW      Fast Contraction (# of 1 sec contractions performed) 10  -SW      Interior Muscle Comments Patient on menses today therefore external sensors used with SEMG assessment.   -SW          Perineal Observation    Perineal Observation Performed? Yes   tampon placed; no internal assessment completed this date.   -SW         Observation of Contraction in Perineum    Anal Montchanin Present  -SW      Perineal Body Lift Present  -SW         SEMG Evaluation    Pelvic Floor Electrode External Surface  -SW      Baseline Resting Yes  -SW      SEMG Evaluation Comments Normal baselines continued pre/post contraction.  Improved recruitment of muscles this date.  Higher amplitudes of 10-12 av for endurance and 15-20mv of PF activity with QF contractions.  Improved awareness of muscle coordination this visit.   -         Education Provided On:    Education Points HEP;Behavioral modifications;Information on dietary irritants to bladder/bowels  -      Method of Delivery Verbal;Demonstration;Written  -      Education Provided To Patient  -SW      Level of Understanding Teach back education performed;Verbalized;Demonstrated  -SW      HEP Comments HEP: endurance 10 sec hold x 5 reps x 2 cycles am/PM.  Quick after every void and up to 1-2 sets of 10 through day.   -        User Key  (r) = Recorded By, (t) = Taken By, (c) = Cosigned By    Initials Name Provider Type    Mary Belcher, PT Physical Therapist              PT Ortho     Row Name 10/22/18 0900       Subjective Pain    Able to rate subjective pain? yes  -SW    Pre-Treatment Pain Level 0  -SW       Posture/Observations    Posture- WNL Posture is WNL  -SW    Posture/Observations Comments Good awareness of PF this date.  Increased recruitment noted.  Menses present and patient requested external sensors for training.   -      User Key  (r) = Recorded By, (t) = Taken By, (c) = Cosigned By    Initials Name Provider Type    Mary Belcher PT Physical Therapist                           PT Assessment/Plan     Row Name 10/22/18 0900          PT Assessment    Functional Limitations Performance in work activities;Performance in leisure  activities;Performance in self-care ADL  -SW     Impairments Impaired muscle power;Impaired muscle endurance;Muscle strength  -SW     Assessment Comments Patient demonstrated improved muscle recruitment this date with regards to PF.  Increased endurance and amplitudes with performance.  Patient understood HEP as assigned.  She is compliant with recommendations for dietary irritants and trying to manage void schedule.  She admits to staying more with schedule when not working, but overall still good control of urine and with urgency. STG 1 and 2 met this visit.   -SW     Rehab Potential Good  -SW     Patient/caregiver participated in establishment of treatment plan and goals Yes  -SW     Patient would benefit from skilled therapy intervention Yes  -SW        PT Plan    PT Frequency 1x/week  -SW     Predicted Duration of Therapy Intervention (Therapy Eval) 6-10 visits  -SW     PT Plan Comments Continue with uptraining, but move to seated position next visit.    -       User Key  (r) = Recorded By, (t) = Taken By, (c) = Cosigned By    Initials Name Provider Type    SW Mary Arita, PT Physical Therapist                      Exercises     Row Name 10/22/18 0900             Subjective Comments    Subjective Comments I am trying to stretch the void schedule out as I can.  Work has made that difficult.  Between 2.5 to 3 hours.  One or two leaks (small) since last being seen.  Trying to watch fluids and dec irritants.  Total volume is still a little less than desired but overal intake of irritant is reduced.  Has defintely seen some improvement in urgency associated with bladder.  She feels she is making progress for sure even early on in this process.   -SW         Subjective Pain    Able to rate subjective pain? yes  -SW      Pre-Treatment Pain Level 0  -SW      Post-Treatment Pain Level 0  -SW         Exercise 1    Exercise Name 1 SEMG resting  -SW      Additional Comments Initial resting tone elevated with  change of external sensors.  With repositioning, patient was able to demonstrate improved resting tone to PF.   -         Exercise 2    Exercise Name 2 SEMG QF PF  -SW      Sets 2 2  -SW      Reps 2 10  -SW      Additional Comments Improved amplitude of contraction this visit.  avg amplitude of 15-20 mv. in isolation noted.   -SW         Exercise 3    Exercise Name 3 SEMG endurance PF  -SW      Sets 3 2  -SW      Reps 3 5  -SW      Time 3 10 sec  -SW      Additional Comments Improved endurance and stability this visit with contraction. Amplitudes of 10-12 mv. Slight delay in relaxation post contraction noted but able to return to baseline within 5 sec.   -         Exercise 4    Exercise Name 4 Review with behavioral management skills/training  -      Additional Comments Patient is doing well with void schedule. Struggles a little with work days, but void on schedule most of time. Worked up to 2.5 to 3 hours.  Still with some irritants but overall reduction has occurred with good inc awareness.   -        User Key  (r) = Recorded By, (t) = Taken By, (c) = Cosigned By    Initials Name Provider Type    Mary Belcher, PT Physical Therapist                            PT OP Goals     Row Name 10/22/18 0900          PT Short Term Goals    STG Date to Achieve 11/01/18  -     STG 1 patient to complete bladder diary x 3 consecutive visits.   -     STG 1 Progress Met  -     STG 2 patient to demonstrate tolieting position for improved evacuation of bladder to effectively eliminate bladder without need for double voids.   -     STG 2 Progress Met  -     STG 3 Patient to demonstrate coordination of PF to perform 10 QF in isolation with full return to baseline in seated position.  -     STG 3 Progress New  -     STG 4 Patient to begin void schedules with 75% success at every 3 hour interval voids during waking day.   -     STG 4 Progress New  -     STG 5 patient to inc endurance of PF such that  she is able to sustain contraction supine x 7 sec with good stability without accessory muscle recruitment.   -     STG 5 Progress Ongoing;Progressing  -     STG 5 Progress Comments 10 sec hold this visit.  Needs consistency  -        Long Term Goals    LTG Date to Achieve 02/01/19  -     LTG 1 PFDI SF 20 equal to 20% or less disability implying improved QOL.   -     LTG 1 Progress New  -     LTG 2 Patient to report complete emptying of bladder without evidence of double void occurence.   -     LTG 2 Progress New  -     LTG 3 Patient to inc confidence of bladder control that she eliminates pads at night, and potentially in the day.   -     LTG 3 Progress New  -     LTG 4 Patient to demonstrate coordination to PF to allow for SEMG with seated and standing postures such that she is able to activate in isolation with amplitudes up to 7mv or > with full return to baseline x 10 reps and/or endurance of 10 sec x 10 reps with good stability of PF.   -     LTG 4 Progress New  -        Time Calculation    PT Goal Re-Cert Due Date 11/01/18  -       User Key  (r) = Recorded By, (t) = Taken By, (c) = Cosigned By    Initials Name Provider Type    Mary Belcher, PT Physical Therapist           Therapy Education  Given: HEP  Program: Progressed  How Provided: Verbal, Demonstration, Written  Provided to: Patient  Level of Understanding: Teach back education performed, Demonstrated, Verbalized              Time Calculation:   Start Time: 0923  Stop Time: 1021  Time Calculation (min): 58 min  Therapy Suggested Charges     Code   Minutes Charges    None           Therapy Charges for Today     Code Description Service Date Service Provider Modifiers Qty    [id number]  PT THER PROC EA 15 MIN 10/22/2018 Mary Arita, PT GP 4                    Mary Arita PT  10/22/2018

## 2018-10-29 ENCOUNTER — HOSPITAL ENCOUNTER (OUTPATIENT)
Dept: PHYSICAL THERAPY | Facility: HOSPITAL | Age: 48
Setting detail: THERAPIES SERIES
Discharge: HOME | End: 2018-10-29

## 2018-10-29 DIAGNOSIS — N39.3 SUI (STRESS URINARY INCONTINENCE, FEMALE): Primary | ICD-10-CM

## 2018-10-29 PROCEDURE — 97110 THERAPEUTIC EXERCISES: CPT | Performed by: PHYSICAL THERAPIST

## 2018-10-29 NOTE — THERAPY RE-EVALUATION
Outpatient Physical Therapy Women's Health Progress Note  [facility]     Patient Name: Josie Waller  : 1970  MRN: [id number]  Today's Date: 10/29/2018        Visit Date: 10/29/2018  Visit number:   Recheck: 18  Insurance: 10 visits (18)  % improvement: 50-60%    Visit Dx:    ICD-10-CM ICD-9-CM   1. VAHID (stress urinary incontinence, female) N39.3 625.6       Patient Active Problem List   Diagnosis   • Morbid obesity due to excess calories (CMS/[facility])   • Vitamin D deficiency   • Prediabetes   • Essential hypertension   • Perimenopausal   • Surveillance of contraceptive pill              Women's Health     Row Name 10/29/18 1100             Pregnancy Questions    Number of Children 1  -SW      Type of Previous Deliveries    emergency   -SW         Pelvic Floor Muscle    Strength (Right) 3: Squeeze with/without lift  -SW      Strength (Left) 3: Squeeze with/without lift  -SW      Symmetry of Sustained Maximal Contraction Symmetrical  -SW      Endurance (Ability to Hold Maximal Contraction) 10 sec  -SW      # of Reps of Maximal Contractions while Maintaining Endurance and Strength 10 sets  -SW      Fast Contraction (# of 1 sec contractions performed) 10  -SW      Interior Muscle Comments No internal assessment completed this visit.  Patient inserted sesor herself.    -SW         Perineal Observation    Perineal Observation Performed? --  -SW         Observation of Contraction in Perineum    Anal [place] --  -SW      Perineal Body Lift --  -SW         SEMG Evaluation    Pelvic Floor Electrode Internal Vaginal  -SW      Baseline Resting Yes  -SW      SEMG Evaluation Comments Patient able to engage PF muscles while seated with isolation and good amplitudes with full return to baseline.  Coordination of endurance training with good control and stability x 10 sec as well.  Patient able to fully return to baselines without hesitancy while seated.  Standing PF activity  showed slight elevation of resting tone. With downtraining, she was quickly and readily able to achieve normal baselines while standing.  Followed by effective QF and endurance contractions that allowed full return to baseline norms.    -         Education Provided On:    Education Points HEP;Behavioral modifications;Information on dietary irritants to bladder/bowels  -      Method of Delivery Verbal;Demonstration;Written  -      Education Provided To Patient  -      Level of Understanding Teach back education performed;Verbalized;Demonstrated  -        User Key  (r) = Recorded By, (t) = Taken By, (c) = Cosigned By    Initials Name Provider Type    Mary Belcher PT Physical Therapist              PT Ortho     Row Name 10/29/18 1100       Subjective Comments    Subjective Comments Noticed a couple of leaks this week while standing.  No effort or exertion, just standing position. Voids 2:30 hours.  Usually the way it works with schedule at work. Urgency still strong at 2:30.  Nocturia none.  Sees the coffee as an irritant.  Found it more difficult to do the exercises than she thought.  Just dont lay down much.   -       Subjective Pain    Able to rate subjective pain? yes  -SW    Pre-Treatment Pain Level 0  -SW    Post-Treatment Pain Level 0  -SW       Posture/Observations    Posture- WNL Posture is WNL  -SW    Posture/Observations Comments Judgement and mood is normal.  NO distress in appearance.  Patient is able to insert sensor independently.   -      User Key  (r) = Recorded By, (t) = Taken By, (c) = Cosigned By    Initials Name Provider Type    Mary Belcher, PT Physical Therapist                           PT Assessment/Plan     Row Name 10/29/18 1100          PT Assessment    Functional Limitations Performance in work activities;Performance in leisure activities;Performance in self-care ADL  -     Impairments Impaired muscle power;Impaired muscle endurance;Muscle strength  -      Assessment Comments Patient has made excellent progress with PF training.  She is able to demo seated and standing stability of activation without hesitancy to resting following work to PF.  STG 1-3 and 5 met with good progression with other goal achievement.  Small leaks noted in standing position this past week.  But agreed that as PF increases in tone and stability, this too should improve as PF learns to work against flow of gravity.  Excellent progression with program.  Patient is very compliant with recommendations given.  Feel positive that full return to function and control is in her future.   -SW     Rehab Potential Good  -SW     Patient/caregiver participated in establishment of treatment plan and goals Yes  -SW     Patient would benefit from skilled therapy intervention Yes  -SW        PT Plan    PT Frequency 1x/week  -SW     Predicted Duration of Therapy Intervention (Therapy Eval) 6-10 visits  -     PT Plan Comments Continue with standing training.  Move to dynamic training.  Check on insurance authorization for visit numbers.   -       User Key  (r) = Recorded By, (t) = Taken By, (c) = Cosigned By    Initials Name Provider Type    Mary Belcher, PT Physical Therapist                      Exercises     Row Name 10/29/18 1100             Subjective Comments    Subjective Comments Noticed a couple of leaks this week while standing.  No effort or exertion, just standing position. Voids 2:30 hours.  Usually the way it works with schedule at work. Urgency still strong at 2:30.  Nocturia none.  Sees the coffee as an irritant.  Found it more difficult to do the exercises than she thought.  Just dont lay down much.   -SW         Subjective Pain    Able to rate subjective pain? yes  -SW      Pre-Treatment Pain Level 0  -SW      Post-Treatment Pain Level 0  -SW         Exercise 1    Exercise Name 1 SEMG resting seated  -SW      Additional Comments Normal resting baselines achieved in seated  position. continual and stable.   -SW         Exercise 2    Exercise Name 2 SEMG QF PF seated   -SW      Sets 2 --  -SW      Reps 2 10  -SW      Additional Comments Able to show good amplitudes of 18-22mv with QF activity in seated position without compensation of TA.  Full return to baseline post contractions.   -SW         Exercise 3    Exercise Name 3 SEMG endurance PF seated   -SW      Sets 3 1  -SW      Reps 3 10  -SW      Time 3 10 sec  -SW      Additional Comments able to show good control and stability with PF while seated.  Good effort in amplitudes of 25-30 with full baseline at 2.9mv or less.   -SW         Exercise 4    Exercise Name 4 SEMG standing resting  -SW      Additional Comments slightly elevated baselines in standing. Initially 3.8mv but following downtraining exercises, tone reduced to 2.9 consistently while standing.   -SW         Exercise 5    Exercise Name 5 SEMG standing QF  -SW      Reps 5 10  -SW      Additional Comments Able to fully activate muscles and drop with effective return to baseline without difficulty.   -SW         Exercise 6    Exercise Name 6 SEMG standing endurance  -SW      Reps 6 5  -SW      Time 6 10 sec  -SW      Additional Comments Able to effectively stabilize PF contraction x 10 sec with stability and good return to baseline post contraction post downtraining of PF in standing position.   -        User Key  (r) = Recorded By, (t) = Taken By, (c) = Cosigned By    Initials Name Provider Type    Mary Belcher PT Physical Therapist                            PT OP Goals     Row Name 10/29/18 1100          PT Short Term Goals    STG Date to Achieve 11/29/18  -     STG 1 patient to complete bladder diary x 3 consecutive visits.   -SW     STG 1 Progress Met  -     STG 2 patient to demonstrate tolieting position for improved evacuation of bladder to effectively eliminate bladder without need for double voids.   -     STG 2 Progress Met  -     STG 3 Patient  to demonstrate coordination of PF to perform 10 QF in isolation with full return to baseline in seated position.  -     STG 3 Progress Met  -     STG 4 Patient to begin void schedules with 75% success at every 3 hour interval voids during waking day.   -     STG 4 Progress Progressing  -     STG 5 patient to inc endurance of PF such that she is able to sustain contraction supine x 7 sec with good stability without accessory muscle recruitment.   -     STG 5 Progress Met  -        Long Term Goals    LTG Date to Achieve 02/01/19  -     LTG 1 PFDI SF 20 equal to 20% or less disability implying improved QOL.   -     LTG 1 Progress New  -     LTG 2 Patient to report complete emptying of bladder without evidence of double void occurence.   -     LTG 2 Progress Met  -     LTG 3 Patient to inc confidence of bladder control that she eliminates pads at night, and potentially in the day.   -     LTG 3 Progress New  -     LTG 4 Patient to demonstrate coordination to PF to allow for SEMG with seated and standing postures such that she is able to activate in isolation with amplitudes up to 7mv or > with full return to baseline x 10 reps and/or endurance of 10 sec x 10 reps with good stability of PF.   -     LTG 4 Progress Ongoing;Progressing  -        Time Calculation    PT Goal Re-Cert Due Date 11/29/18  -       User Key  (r) = Recorded By, (t) = Taken By, (c) = Cosigned By    Initials Name Provider Type    Mary Belcher, PT Physical Therapist           Therapy Education  Given: HEP  Program: Progressed  How Provided: Verbal, Demonstration  Provided to: Patient  Level of Understanding: Verbalized, Demonstrated, Teach back education performed              Time Calculation:   Start Time: 1118  Stop Time: 1214  Time Calculation (min): 56 min  Therapy Suggested Charges     Code   Minutes Charges    None           Therapy Charges for Today     Code Description Service Date Service Provider  Modifiers Qty    [id number]  PT THER PROC EA 15 MIN 10/29/2018 Mary Arita, PT GP 4                    Mary Arita, PT  10/29/2018

## 2018-11-05 ENCOUNTER — HOSPITAL ENCOUNTER (OUTPATIENT)
Dept: PHYSICAL THERAPY | Facility: HOSPITAL | Age: 48
Setting detail: THERAPIES SERIES
Discharge: HOME | End: 2018-11-05

## 2018-11-05 DIAGNOSIS — N39.3 SUI (STRESS URINARY INCONTINENCE, FEMALE): Primary | ICD-10-CM

## 2018-11-05 PROCEDURE — 97110 THERAPEUTIC EXERCISES: CPT | Performed by: PHYSICAL THERAPIST

## 2018-11-05 NOTE — THERAPY TREATMENT NOTE
Outpatient Physical Therapy Women's Health Treatment Note  [facility]     Patient Name: Josie Waller  : 1970  MRN: [id number]  Today's Date: 2018        Visit Date: 2018  Visit number:   Recheck: 18  Insurance: 6 visits (correction from last chart notes), exp 18  % improvement: 70%    Visit Dx:    ICD-10-CM ICD-9-CM   1. VAHID (stress urinary incontinence, female) N39.3 625.6       Patient Active Problem List   Diagnosis   • Morbid obesity due to excess calories (CMS/[facility])   • Vitamin D deficiency   • Prediabetes   • Essential hypertension   • Perimenopausal   • Surveillance of contraceptive pill              Women's Health     Row Name 18 1200             Pregnancy Questions    Number of Pregnancies 1  -SW      Number of Children 1  -SW      Type of Previous Deliveries    emergency   -SW         Subjective Pain    Pre-Treatment Pain Level 0  -SW         Pelvic Floor Muscle    Strength (Right) 3: Squeeze with/without lift  -SW      Strength (Left) 3: Squeeze with/without lift  -SW      Symmetry of Sustained Maximal Contraction Symmetrical  -SW      Endurance (Ability to Hold Maximal Contraction) 10 sec  -SW      # of Reps of Maximal Contractions while Maintaining Endurance and Strength 10 sets  -SW      Fast Contraction (# of 1 sec contractions performed) 10  -SW      Interior Muscle Comments none this visit  -SW         Pelvic Floor    Ability to Isolate Contraction of Pelvic Floor Yes  -SW         SEMG Evaluation    Pelvic Floor Electrode Internal Vaginal  -SW      Baseline Resting Yes  -SW      SEMG Evaluation Comments Normal resting in seated and standing postures.  Seated: 10 sec endurance with amplitudes of 18-20 mv and QF amplitudes to 25+ with full relaxation to follow.  Standing demonstrates excellent recruitment patterns as well but with slightly lower amplitudes of endurance 12mv and QF 15-20mv.  Excellent coordination.  Also  able to demo dynamic control of PF with movements.  -         Education Provided On:    Education Points HEP;Behavioral modifications;Information on dietary irritants to bladder/bowels  -      Method of Delivery Verbal;Demonstration;Written  -      Education Provided To Patient  -      Level of Understanding Teach back education performed;Verbalized;Demonstrated  -        User Key  (r) = Recorded By, (t) = Taken By, (c) = Cosigned By    Initials Name Provider Type    Mary Belcher PT Physical Therapist              PT Ortho     Row Name 11/05/18 1200       Subjective Comments    Subjective Comments Bladder voids are good.  One or two leaks small leaks but had already had the urge/need to go to [place].  Doesnt notice leaks in early morning.    -       Subjective Pain    Able to rate subjective pain? yes  -SW    Post-Treatment Pain Level 0  -SW       Posture/Observations    Posture- WNL Posture is WNL  -SW    Posture/Observations Comments No distress.  Good spirits.  Excellent pelvic floor awareness with exercise this visit.   -      User Key  (r) = Recorded By, (t) = Taken By, (c) = Cosigned By    Initials Name Provider Type    Mary Belcher, PT Physical Therapist                           PT Assessment/Plan     Row Name 11/05/18 1200          PT Assessment    Functional Limitations Performance in work activities;Performance in leisure activities;Performance in self-care ADL  -     Impairments Impaired muscle power;Impaired muscle endurance;Muscle strength  -     Assessment Comments Patient continues to show excellent recruitment of PF in seated and standing postures against gravity.  She has commented good support during sneeze and cough episodes without leaks.  Small leaks continue during standing, intermittent and infrequent. Excellent awareness of PF during dynamic movements.  She is compliant with program and expect full recovery of VAHID. Anticipate standing activity with PF  splinting will improve with continued training.   -SW     Rehab Potential Good  -SW     Patient/caregiver participated in establishment of treatment plan and goals Yes  -SW     Patient would benefit from skilled therapy intervention Yes  -SW        PT Plan    PT Frequency 1x/week  -SW     Predicted Duration of Therapy Intervention (Therapy Eval) 6-10 visits  -SW     PT Plan Comments One visit remains with insurance.  Anticipate continuation at least 1-2 visits should insurance allow.    -SW       User Key  (r) = Recorded By, (t) = Taken By, (c) = Cosigned By    Initials Name Provider Type    SW Mary Arita, PT Physical Therapist                      Exercises     Row Name 11/05/18 1200             Subjective Comments    Subjective Comments Bladder voids are good.  One or two leaks small leaks but had already had the urge/need to go to [place].  Doesnt notice leaks in early morning.    -SW         Subjective Pain    Able to rate subjective pain? yes  -SW      Pre-Treatment Pain Level 0  -SW      Post-Treatment Pain Level 0  -SW         Exercise 1    Exercise Name 1 SEMG resting seated  -SW      Additional Comments Normal resting tone achieved quickly at baseline of 2.9mv.   -SW         Exercise 2    Exercise Name 2 SEMG QF PF seated   -SW      Reps 2 10  -SW      Additional Comments Full elevation of contraction to increments of 20-25mv with undelayed return to resting.  -SW         Exercise 3    Exercise Name 3 SEMG endurance PF seated   -SW      Sets 3 1  -SW      Reps 3 10  -SW      Time 3 10 sec  -SW      Additional Comments Able to demo amplitudes of up to 18-20mv with full return to baselines.  Stable and with excellent control.   -SW         Exercise 4    Exercise Name 4 SEMG standing resting  -SW      Time 4 3 min  -SW      Additional Comments Able to demo normal baselines near or at 2.9mv or less.  Slightly elevatd to levels of 3.3 ocassionally until reposition of sensor created improved resting 2.9 or  below.  Consistent and unwaviering.   -SW         Exercise 5    Exercise Name 5 SEMG standing QF  -SW      Sets 5 2  -SW      Reps 5 10  -SW      Additional Comments Amplitudes of 15-18mv with good stability.  No use of accessory to assist.   -SW         Exercise 6    Exercise Name 6 SEMG standing endurance  -SW      Sets 6 2  -SW      Reps 6 5  -SW      Time 6 10 sec  -SW      Additional Comments Excellent stability to endurance contraction in standing.  Amplitudes of 12-15mv without TA assist.  Undelayed relaxation post contraction release.   -SW         Exercise 7    Exercise Name 7 standing dynamic movements  -SW      Additional Comments Dynamic movements such as forward/retro step, lateral steps, squat to stand and reaching activities were implemented this day for trial of PF recruitment patterns.  Patient demonstrated excellent recruitment of PF for added stability to movement without difficulty.  -        User Key  (r) = Recorded By, (t) = Taken By, (c) = Cosigned By    Initials Name Provider Type    Mary Belcher, PT Physical Therapist                            PT OP Goals     Row Name 11/05/18 1200          PT Short Term Goals    STG Date to Achieve 11/29/18  -     STG 1 patient to complete bladder diary x 3 consecutive visits.   -     STG 1 Progress Met  -     STG 2 patient to demonstrate tolieting position for improved evacuation of bladder to effectively eliminate bladder without need for double voids.   -     STG 2 Progress Met  -     STG 3 Patient to demonstrate coordination of PF to perform 10 QF in isolation with full return to baseline in seated position.  -     STG 3 Progress Met  -     STG 4 Patient to begin void schedules with 75% success at every 3 hour interval voids during waking day.   -     STG 4 Progress Progressing  -     STG 5 patient to inc endurance of PF such that she is able to sustain contraction supine x 7 sec with good stability without accessory  muscle recruitment.   -     STG 5 Progress Met  -        Long Term Goals    LTG Date to Achieve 02/01/19  -     LTG 1 PFDI SF 20 equal to 20% or less disability implying improved QOL.   -     LTG 1 Progress New  -     LTG 2 Patient to report complete emptying of bladder without evidence of double void occurence.   -     LTG 2 Progress Met  -     LTG 3 Patient to inc confidence of bladder control that she eliminates pads at night, and potentially in the day.   -     LTG 3 Progress Ongoing;Progressing  -     LTG 3 Progress Comments eliminated at night; partially thru the day while home.   -     LTG 4 Patient to demonstrate coordination to PF to allow for SEMG with seated and standing postures such that she is able to activate in isolation with amplitudes up to 7mv or > with full return to baseline x 10 reps and/or endurance of 10 sec x 10 reps with good stability of PF.   -     LTG 4 Progress Met  -        Time Calculation    PT Goal Re-Cert Due Date 11/29/18  -       User Key  (r) = Recorded By, (t) = Taken By, (c) = Cosigned By    Initials Name Provider Type    Mary Belcher, GUY Physical Therapist           Therapy Education  Given: HEP  Program: Progressed  How Provided: Verbal, Demonstration  Provided to: Patient  Level of Understanding: Teach back education performed, Verbalized, Demonstrated              Time Calculation:   Start Time: 1218  Stop Time: 1320  Time Calculation (min): 62 min  Therapy Suggested Charges     Code   Minutes Charges    None           Therapy Charges for Today     Code Description Service Date Service Provider Modifiers Qty    [id number]  PT THER PROC EA 15 MIN 11/5/2018 Mary Arita, PT GP 4                    Mary Arita PT  11/5/2018

## 2018-11-12 ENCOUNTER — HOSPITAL ENCOUNTER (OUTPATIENT)
Dept: PHYSICAL THERAPY | Facility: HOSPITAL | Age: 48
Setting detail: THERAPIES SERIES
Discharge: HOME | End: 2018-11-12

## 2018-11-12 DIAGNOSIS — N39.3 SUI (STRESS URINARY INCONTINENCE, FEMALE): Primary | ICD-10-CM

## 2018-11-12 RX ORDER — NORGESTIMATE AND ETHINYL ESTRADIOL 7DAYSX3 28
KIT ORAL
Qty: 28 TABLET | Refills: 3 | Status: SHIPPED | OUTPATIENT
Start: 2018-11-12 | End: 2019-03-04 | Stop reason: SDUPTHER

## 2018-11-19 ENCOUNTER — OFFICE VISIT (OUTPATIENT)
Dept: ORTHOPEDIC SURGERY | Facility: CLINIC | Age: 48
End: 2018-11-19

## 2018-11-19 ENCOUNTER — TRANSCRIBE ORDERS (OUTPATIENT)
Dept: ORTHOPEDIC SURGERY | Facility: CLINIC | Age: 48
End: 2018-11-19

## 2018-11-19 VITALS — WEIGHT: 230 LBS | BODY MASS INDEX: 36.96 KG/M2 | HEIGHT: 66 IN

## 2018-11-19 DIAGNOSIS — M79.662 PAIN OF LEFT CALF: Primary | ICD-10-CM

## 2018-11-19 DIAGNOSIS — S86.112A GASTROCNEMIUS TEAR, LEFT, INITIAL ENCOUNTER: Primary | ICD-10-CM

## 2018-11-19 PROCEDURE — 99203 OFFICE O/P NEW LOW 30 MIN: CPT | Performed by: ORTHOPAEDIC SURGERY

## 2018-11-19 NOTE — PROGRESS NOTES
Josie Waller is a 48 y.o. female   Primary provider:  Aziza Mccoy MD       Chief Complaint   Patient presents with   • Left Lower Leg - Pain       HISTORY OF PRESENT ILLNESS: Patient is here today for left calf pain. Patient states that she was a benefit dinner on 11/8/2018 and was skipping and felt like a baseball bat his her in her calf. She states that her pain is 2/10 today. She brought a MRI disc with her.  No history of prior injury.  He was immediate not associated with neurologic findings.    History of Present Illness     CONCURRENT MEDICAL HISTORY:    Past Medical History:   Diagnosis Date   • Backache    • Contact dermatitis     from hair dye      • Encounter for gynecological examination (general) (routine) without abnormal findings    • Encounter for surveillance of contraceptive pills    • History of acute bronchitis    • History of mammography, screening 06/28/2016   • Other disorders of menstruation and other abnormal bleeding from female genital tract    • Pain in left lower leg     questionable stress fx      • Palpitations    • Paresthesia    • Paresthesia of lower extremity     right thigh      • Unspecified Symptom associated with female genital organs    • Upper respiratory infection    • Visit for gynecologic examination        Allergies   Allergen Reactions   • Latex      Hand irritation   • Nickel      Hand irritation   • Other      Environmental         Current Outpatient Medications:   •  hydrochlorothiazide (MICROZIDE) 12.5 MG capsule, TAKE 1 CAPSULE BY MOUTH ONCE DAILY, Disp: 30 capsule, Rfl: 0  •  norgestimate-ethinyl estradiol (TRI-LO-SPRINTEC) 0.18/0.215/0.25 MG-25 MCG per tablet, Take 1 tablet by mouth Daily., Disp: 84 tablet, Rfl: 4  •  Omega-3 Fatty Acids (FISH OIL) 1000 MG capsule capsule, Take 1,000 mg by mouth Daily With Breakfast., Disp: , Rfl:   •  TiZANidine (ZANAFLEX) 4 MG capsule, Take 4 mg by mouth 2 (Two) Times a Day., Disp: , Rfl:   •   CETIRIZINE HCL PO, Take  by mouth., Disp: , Rfl:   •  cholecalciferol (VITAMIN D3) 1000 units tablet, Take 1,000 Units by mouth 2 (Two) Times a Day., Disp: , Rfl:   •  fluticasone (FLONASE) 50 MCG/ACT nasal spray, 1 spray into the nostril(s) as directed by provider Daily., Disp: , Rfl:   •  Scopolamine (TRANSDERM-SCOP, 1.5 MG,) 1.5 MG/3DAYS patch, Place 1 patch on the skin Every 72 (Seventy-Two) Hours., Disp: 5 patch, Rfl: 1  •  TRI-SPRINTEC 0.18/0.215/0.25 MG-35 MCG per tablet, AS DIRECTED ----NEEDS  TO  BE  SEEN, Disp: 28 tablet, Rfl: 3    Past Surgical History:   Procedure Laterality Date   •  SECTION  07/15/2014    Fetal distress, primary  section.   • EAR TUBES      as a child   • INJECTION OF MEDICATION  2016    Kenalog (2)    • INJECTION OF MEDICATION  2011    Solu-Medrol (1)    • PAP SMEAR  2011    Negative   • TONSILLECTOMY         Family History   Problem Relation Age of Onset   • Hypertension Mother    • Atrial fibrillation Mother    • Hypothyroidism Mother    • Lung cancer Father    • Cancer Other    • Diabetes Other    • Heart disease Other    • Hypertension Other    • Thyroid disease Other        Social History     Socioeconomic History   • Marital status:      Spouse name: Not on file   • Number of children: Not on file   • Years of education: Not on file   • Highest education level: Not on file   Social Needs   • Financial resource strain: Not on file   • Food insecurity - worry: Not on file   • Food insecurity - inability: Not on file   • Transportation needs - medical: Not on file   • Transportation needs - non-medical: Not on file   Occupational History   • Occupation:      Employer: [employer]   Tobacco Use   • Smoking status: Never Smoker   • Smokeless tobacco: Never Used   Substance and Sexual Activity   • Alcohol use: No   • Drug use: No   • Sexual activity: Yes     Partners: Male   Other Topics Concern   • Not on file   Social History  "Narrative   • Not on file        Review of Systems   Constitutional: Negative for chills and fever.   HENT: Negative for facial swelling.    Respiratory: Negative for apnea and shortness of breath.    Cardiovascular: Negative for chest pain and leg swelling.   Gastrointestinal: Negative for abdominal pain, nausea and vomiting.   Genitourinary: Negative for dysuria.   Musculoskeletal: Positive for gait problem and joint swelling.   Skin: Negative for color change.   Neurological: Negative for seizures and syncope.   Hematological: Negative for adenopathy.   Psychiatric/Behavioral: Negative for dysphoric mood.       PHYSICAL EXAMINATION:       Ht 167.6 cm (66\")   Wt 104 kg (230 lb)   BMI 37.12 kg/m²     Physical Exam   Constitutional: She is oriented to person, place, and time. She appears well-developed.   HENT:   Head: Normocephalic and atraumatic.   Eyes: EOM are normal. Pupils are equal, round, and reactive to light.   Neck: Neck supple.   Pulmonary/Chest: Effort normal.   Musculoskeletal: She exhibits tenderness.   Neurological: She is alert and oriented to person, place, and time.   Skin: Skin is warm and dry.   Psychiatric: She has a normal mood and affect.   Vitals reviewed.      GAIT:     []  Normal  [x]  Antalgic    Assistive device: []  None  []  Walker     [x]  Crutches  []  Cane     []  Wheelchair  []  Stretcher    Ortho Exam   : Grossly intact tender medial gastroc region.  Pain with passive stretching.  No pitting edema mild venous stasis changes are noted.        No results found.  MRI scan is reviewed and appears the heel cord is intact there is a medial gastroc tear.      ASSESSMENT:    Diagnoses and all orders for this visit:    Gastrocnemius tear, left, initial encounter          PLAN we will massage, range of motion, weightbearing as tolerated, compressive wrap, follow up in 3 weeks and reexamine at that point.    No Follow-up on file.    Kris Casas MD    "

## 2018-11-20 ENCOUNTER — TELEPHONE (OUTPATIENT)
Dept: ORTHOPEDIC SURGERY | Facility: CLINIC | Age: 48
End: 2018-11-20

## 2018-12-03 ENCOUNTER — OFFICE VISIT (OUTPATIENT)
Dept: ORTHOPEDIC SURGERY | Facility: CLINIC | Age: 48
End: 2018-12-03

## 2018-12-03 VITALS — WEIGHT: 246 LBS | BODY MASS INDEX: 39.53 KG/M2 | HEIGHT: 66 IN

## 2018-12-03 DIAGNOSIS — S86.112D GASTROCNEMIUS TEAR, LEFT, SUBSEQUENT ENCOUNTER: Primary | ICD-10-CM

## 2018-12-03 PROCEDURE — 99212 OFFICE O/P EST SF 10 MIN: CPT | Performed by: ORTHOPAEDIC SURGERY

## 2018-12-03 NOTE — PROGRESS NOTES
"Josie Waller is a 48 y.o. female returns for     Chief Complaint   Patient presents with   • Left Lower Leg - Follow-up     calf       HISTORY OF PRESENT ILLNESS: f/u left calf pain.   Patient states that pain has improved since last visit, is able to walk with assistance of cane.        CONCURRENT MEDICAL HISTORY:    The following portions of the patient's history were reviewed and updated as appropriate: allergies, current medications, past family history, past medical history, past social history, past surgical history and problem list.     ROS  No fevers or chills.  No chest pain or shortness of air.  No GI or  disturbances.    PHYSICAL EXAMINATION:       Ht 167.6 cm (66\")   Wt 112 kg (246 lb)   BMI 39.71 kg/m²     Physical Exam   Constitutional: She is oriented to person, place, and time. She appears well-developed and well-nourished.   HENT:   Head: Normocephalic and atraumatic.   Eyes: EOM are normal. Pupils are equal, round, and reactive to light.   Neck: Neck supple.   Pulmonary/Chest: Effort normal.   Musculoskeletal: She exhibits edema and tenderness.   Neurological: She is alert and oriented to person, place, and time.   Skin: Skin is warm and dry.   Psychiatric: She has a normal mood and affect.   Vitals reviewed.      GAIT:     []  Normal  []  Antalgic    Assistive device: []  None  []  Walker     []  Crutches  [x]  Cane     []  Wheelchair  []  Stretcher    Ortho Exam   Tender medial gastroc.  His.  Heel cord is intact.  Vascular intact.  No pitting edema.    No results found.          ASSESSMENT:    Diagnoses and all orders for this visit:    Gastrocnemius tear, left, subsequent encounter          PLAN clinically she is better.  We talked about stretching exercises warming up well and anticipate will take another 2-3 months continue to improve with any problems whatsoever but she seems pretty good understanding of the problem and hallux, take her to get better.  She'll call with any " problems    No Follow-up on file.    Kris Casas MD

## 2019-03-04 RX ORDER — NORGESTIMATE AND ETHINYL ESTRADIOL 7DAYSX3 28
KIT ORAL
Qty: 28 TABLET | Refills: 3 | Status: SHIPPED | OUTPATIENT
Start: 2019-03-04 | End: 2019-06-26 | Stop reason: SDUPTHER

## 2019-03-11 NOTE — THERAPY DISCHARGE NOTE
Outpatient Physical Therapy Discharge Summary  [facility]       Patient Name: Josie Waller  : 1970  MRN: [id number]    Today's Date: 3/11/2019  UT Date: 18  Improvement: 70-80%    Visit Dx:    ICD-10-CM ICD-9-CM   1. VAHID (stress urinary incontinence, female) N39.3 625.6           OP PT Discharge Summary  Date of Discharge: 18  Reason for Discharge: Maximum functional potential achieved(Patient was approx 70-80% better.  Phoned PT requested hold to VAHID therapy due to tear to Gastroc muscle of leg.  She can hardly walk and concerned she will need additional therapy for calf. Doing well with bladder as of now. Will call if needed. )  Outcomes Achieved: Refer to plan of care for updates on goals achieved  Discharge Destination: Home with home program  Discharge Instructions/Additional Comments: continue HEP as assigned.       Time Calculation:[id number]              Mary Arita, PT  3/11/2019

## 2019-06-26 RX ORDER — NORGESTIMATE AND ETHINYL ESTRADIOL 7DAYSX3 28
KIT ORAL
Qty: 28 TABLET | Refills: 3 | Status: SHIPPED | OUTPATIENT
Start: 2019-06-26 | End: 2019-10-15 | Stop reason: SDUPTHER

## 2019-10-14 RX ORDER — NORGESTIMATE AND ETHINYL ESTRADIOL 7DAYSX3 28
KIT ORAL
Qty: 28 TABLET | Refills: 3 | OUTPATIENT
Start: 2019-10-14

## 2019-10-15 ENCOUNTER — TELEPHONE (OUTPATIENT)
Dept: OBSTETRICS AND GYNECOLOGY | Facility: CLINIC | Age: 49
End: 2019-10-15

## 2019-10-15 RX ORDER — NORGESTIMATE AND ETHINYL ESTRADIOL 7DAYSX3 28
1 KIT ORAL DAILY
Qty: 28 TABLET | Refills: 3 | Status: SHIPPED | OUTPATIENT
Start: 2019-10-15 | End: 2020-02-03

## 2020-02-03 RX ORDER — NORGESTIMATE AND ETHINYL ESTRADIOL 7DAYSX3 28
KIT ORAL
Qty: 28 TABLET | Refills: 5 | Status: SHIPPED | OUTPATIENT
Start: 2020-02-03 | End: 2020-07-13

## 2020-07-13 ENCOUNTER — OFFICE VISIT (OUTPATIENT)
Dept: OBSTETRICS AND GYNECOLOGY | Facility: CLINIC | Age: 50
End: 2020-07-13

## 2020-07-13 VITALS
BODY MASS INDEX: 42.34 KG/M2 | WEIGHT: 248 LBS | SYSTOLIC BLOOD PRESSURE: 136 MMHG | HEIGHT: 64 IN | DIASTOLIC BLOOD PRESSURE: 74 MMHG

## 2020-07-13 DIAGNOSIS — Z01.419 WOMEN'S ANNUAL ROUTINE GYNECOLOGICAL EXAMINATION: Primary | ICD-10-CM

## 2020-07-13 DIAGNOSIS — Z30.41 ORAL CONTRACEPTIVE PILL SURVEILLANCE: ICD-10-CM

## 2020-07-13 DIAGNOSIS — Z12.39 SCREENING FOR MALIGNANT NEOPLASM OF BREAST: ICD-10-CM

## 2020-07-13 DIAGNOSIS — L24.89 IRRITANT CONTACT DERMATITIS DUE TO OTHER AGENTS: ICD-10-CM

## 2020-07-13 PROCEDURE — 99396 PREV VISIT EST AGE 40-64: CPT | Performed by: NURSE PRACTITIONER

## 2020-07-13 RX ORDER — CHOLECALCIFEROL (VITAMIN D3) 125 MCG
500 CAPSULE ORAL DAILY
COMMUNITY
End: 2021-02-01

## 2020-07-13 RX ORDER — TIZANIDINE 4 MG/1
4 TABLET ORAL 2 TIMES DAILY PRN
COMMUNITY
Start: 2020-05-28 | End: 2021-02-01

## 2020-07-13 RX ORDER — ACETAMINOPHEN AND CODEINE PHOSPHATE 120; 12 MG/5ML; MG/5ML
1 SOLUTION ORAL DAILY
Qty: 84 TABLET | Refills: 4 | Status: SHIPPED | OUTPATIENT
Start: 2020-07-13 | End: 2021-02-08

## 2020-07-13 RX ORDER — MELOXICAM 7.5 MG/1
7.5 TABLET ORAL DAILY
COMMUNITY
Start: 2020-05-10 | End: 2021-02-01

## 2020-07-13 NOTE — PROGRESS NOTES
Subjective   Josie Waller is a 49 y.o. Annual gynecological exam, refill oral contraception, irritation     Mammo: BI-RADS 2, 2018  LMP: 06/22/2020  Pap: NIL, negative HPV  Dexa: 2019    Pt presents for annual gynecological exam with desire for oral contraception refill and complaints of irritation with pad use.  She denies any undesirable side effects with oral contraception. She experiences occasional stress incontinence, so she wears a pad daily.  In the past she saw GUY Hernandez and does report some relief of urinary symptoms.  The pad use causes her perineum to feel irritated.  PMH: essential hypertension, adenomyosis.      Gynecologic Exam   The patient's pertinent negatives include no genital itching, genital lesions, genital odor, genital rash, missed menses, pelvic pain, vaginal bleeding or vaginal discharge. The patient is experiencing no pain. She is not pregnant. Pertinent negatives include no chills, constipation, diarrhea, dysuria, fever, flank pain, frequency, headaches, hematuria, rash or urgency. She is sexually active. No, her partner does not have an STD. She uses oral contraceptives for contraception. Her menstrual history has been regular.       The following portions of the patient's history were reviewed and updated as appropriate: allergies, current medications, past family history, past medical history, past social history, past surgical history and problem list.    Review of Systems   Constitutional: Negative for chills, diaphoresis, fatigue, fever and unexpected weight change.   Respiratory: Negative for apnea, chest tightness and shortness of breath.    Cardiovascular: Negative for chest pain and palpitations.   Gastrointestinal: Negative for abdominal distention, constipation and diarrhea.   Genitourinary: Negative for decreased urine volume, difficulty urinating, dyspareunia, dysuria, enuresis, flank pain, frequency, genital sores, hematuria, menstrual problem, missed menses,  pelvic pain, urgency, vaginal bleeding, vaginal discharge and vaginal pain.   Skin: Negative for rash.   Neurological: Negative for headaches.   Psychiatric/Behavioral: Negative for sleep disturbance and suicidal ideas.         Objective   Physical Exam   Constitutional: She is oriented to person, place, and time. She appears well-developed and well-nourished.   Neck: No thyromegaly present.   Cardiovascular: Normal rate, regular rhythm, normal heart sounds and intact distal pulses.   Pulmonary/Chest: Effort normal and breath sounds normal. Right breast exhibits no inverted nipple, no mass, no nipple discharge, no skin change and no tenderness. Left breast exhibits no inverted nipple, no mass, no nipple discharge, no skin change and no tenderness. No breast discharge or bleeding. Breasts are symmetrical.   Fibrocystic breast tissue bilaterally   Abdominal: Soft. Bowel sounds are normal. She exhibits no distension. There is no tenderness.   Genitourinary: Vagina normal and uterus normal. No breast discharge or bleeding. Pelvic exam was performed with patient supine. There is no rash, tenderness, lesion or injury on the right labia. There is no rash, tenderness, lesion or injury on the left labia. Cervix exhibits no motion tenderness, no discharge and no friability.   Genitourinary Comments: Pap not due.  Adnexa non-palpable 2/2 body habitus    Lymphadenopathy:     She has no axillary adenopathy.        Right: No inguinal adenopathy present.        Left: No inguinal adenopathy present.   Neurological: She is alert and oriented to person, place, and time.   Skin: Skin is warm, dry and intact.   Psychiatric: She has a normal mood and affect. Her speech is normal and behavior is normal.   Nursing note and vitals reviewed.        Assessment/Plan   Diagnoses and all orders for this visit:    Women's annual routine gynecological examination    Oral contraceptive pill surveillance  -     norethindrone (MICRONOR) 0.35 MG  tablet; Take 1 tablet by mouth Daily.    Screening for malignant neoplasm of breast  -     Mammo Screening Digital Tomosynthesis Bilateral With CAD; Future    Irritant contact dermatitis due to other agents  -     halobetasol (ULTRAVATE) 0.05 % cream; Apply  topically to the appropriate area as directed Daily As Needed for Irritation.    Pt educated and encouraged to continue regular SBEs.  Mammogram ordered.  Need to switch CORY to POP due to recent diagnosis of essential hypertension.  RBA Micronor and reviewed potential side effects.  May consider Mirena for hx adenomyosis if POP does not work well for her.  Contact dermatitis noted on bilateral vulva from chronic pad use.  Encouraged changing pads more frequently.  May apply halobetasol prn once daily.  RBA Halobetasol.  RTC in 1 year for annual gynecological exam or sooner if needed.

## 2021-01-04 ENCOUNTER — TELEPHONE (OUTPATIENT)
Dept: OBSTETRICS AND GYNECOLOGY | Facility: CLINIC | Age: 51
End: 2021-01-04

## 2021-01-04 NOTE — TELEPHONE ENCOUNTER
Returned pt phone call, no answer.  Left vm for her to call me back at her convieince.  ----- Message from Susan Regalado sent at 12/28/2020 11:26 AM CST -----  Regarding: FW:  I told the pt that and she said that she was okay with waiting for you to get back cause she was wanting you to giver her a call  ----- Message -----  From: Henna Perez APRN  Sent: 12/28/2020  10:30 AM CST  To: Susan Regalado  Subject: RE:                                              I’m off this week, but will be back next week  ----- Message -----  From: Dharmesh Sandhu RegSched Rep  Sent: 12/28/2020  10:28 AM CST  To: SONI Evans    Pt is wanting a different BC. She says the ones she is on is not for her

## 2021-01-04 NOTE — TELEPHONE ENCOUNTER
Called and spoke with pt.  She has been feeling very bloated in her abdomen and having trouble with lymphedema in her extremities. Pt wonders if micronor is worsening these issues.  Discussed progestin options with pt including mirena. Pt plans to be off micronor for next month to see if symptoms improve then we will go from there.    ----- Message from Susan Regalado sent at 12/28/2020 11:26 AM CST -----  Regarding: FW:  I told the pt that and she said that she was okay with waiting for you to get back cause she was wanting you to giver her a call  ----- Message -----  From: Henna Perez APRN  Sent: 12/28/2020  10:30 AM CST  To: Susan Regalado  Subject: RE:                                              I’m off this week, but will be back next week  ----- Message -----  From: Dharmesh Sandhu RegSched Rep  Sent: 12/28/2020  10:28 AM CST  To: SONI Evans    Pt is wanting a different BC. She says the ones she is on is not for her

## 2021-02-01 ENCOUNTER — TELEPHONE (OUTPATIENT)
Dept: OBSTETRICS AND GYNECOLOGY | Facility: CLINIC | Age: 51
End: 2021-02-01

## 2021-02-01 ENCOUNTER — OFFICE VISIT (OUTPATIENT)
Dept: FAMILY MEDICINE CLINIC | Facility: CLINIC | Age: 51
End: 2021-02-01

## 2021-02-01 VITALS
DIASTOLIC BLOOD PRESSURE: 88 MMHG | WEIGHT: 249 LBS | HEART RATE: 92 BPM | BODY MASS INDEX: 42.51 KG/M2 | OXYGEN SATURATION: 99 % | HEIGHT: 64 IN | SYSTOLIC BLOOD PRESSURE: 160 MMHG

## 2021-02-01 DIAGNOSIS — Z12.31 ENCOUNTER FOR SCREENING MAMMOGRAM FOR MALIGNANT NEOPLASM OF BREAST: ICD-10-CM

## 2021-02-01 DIAGNOSIS — Z11.59 NEED FOR HEPATITIS C SCREENING TEST: ICD-10-CM

## 2021-02-01 DIAGNOSIS — Z30.430 ENCOUNTER FOR IUD INSERTION: Primary | ICD-10-CM

## 2021-02-01 DIAGNOSIS — E66.01 MORBID OBESITY DUE TO EXCESS CALORIES (HCC): ICD-10-CM

## 2021-02-01 DIAGNOSIS — M47.814 SPONDYLOSIS OF THORACIC REGION WITHOUT MYELOPATHY OR RADICULOPATHY: ICD-10-CM

## 2021-02-01 DIAGNOSIS — I10 ESSENTIAL HYPERTENSION: Primary | ICD-10-CM

## 2021-02-01 DIAGNOSIS — Z76.89 ENCOUNTER TO ESTABLISH CARE: ICD-10-CM

## 2021-02-01 DIAGNOSIS — R73.03 PREDIABETES: ICD-10-CM

## 2021-02-01 PROCEDURE — 99214 OFFICE O/P EST MOD 30 MIN: CPT | Performed by: FAMILY MEDICINE

## 2021-02-01 RX ORDER — FEXOFENADINE HCL 180 MG/1
180 TABLET ORAL DAILY
COMMUNITY

## 2021-02-01 RX ORDER — MELOXICAM 7.5 MG/1
7.5 TABLET ORAL DAILY
Status: CANCELLED | OUTPATIENT
Start: 2021-02-01

## 2021-02-01 RX ORDER — LISINOPRIL AND HYDROCHLOROTHIAZIDE 12.5; 1 MG/1; MG/1
1 TABLET ORAL DAILY
Qty: 30 TABLET | Refills: 2 | Status: SHIPPED | OUTPATIENT
Start: 2021-02-01 | End: 2021-03-01

## 2021-02-01 RX ORDER — HYDROCHLOROTHIAZIDE 12.5 MG/1
12.5 CAPSULE, GELATIN COATED ORAL DAILY
Qty: 30 CAPSULE | Refills: 0 | Status: CANCELLED | OUTPATIENT
Start: 2021-02-01

## 2021-02-01 RX ORDER — MISOPROSTOL 200 UG/1
200 TABLET ORAL ONCE
Qty: 3 TABLET | Refills: 0 | Status: SHIPPED | OUTPATIENT
Start: 2021-02-01 | End: 2021-02-01

## 2021-02-01 RX ORDER — NAPROXEN 500 MG/1
500 TABLET ORAL 2 TIMES DAILY WITH MEALS
Qty: 60 TABLET | Refills: 1 | Status: SHIPPED | OUTPATIENT
Start: 2021-02-01 | End: 2021-03-29

## 2021-02-01 RX ORDER — MONTELUKAST SODIUM 10 MG/1
10 TABLET ORAL NIGHTLY
COMMUNITY
End: 2022-01-05 | Stop reason: SDUPTHER

## 2021-02-01 NOTE — TELEPHONE ENCOUNTER
Patient would like a callback. States she has been off her birth control for 3 weeks and would like to discuss other birth control options.  Please call at [phone].

## 2021-02-01 NOTE — PROGRESS NOTES
"Chief Complaint  Establish Care, Hypertension, and Med Refill    Subjective          Josie Waller presents to [facility] for   History of Present Illness    Patient presents today to establish care.  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  [facility].  Given exercises and using cervical pillow.  These measures help some.  Has had Xray and MRI of the affected spine.  Managing with Mobic.  Could not tell much of a difference in her neck pain with the Mobic, but since being off the Mobic can tell that her knees are more difficult than they were previously.  Patient was offered a muscle relaxer, but notes she did not tolerate this.      Hypertension - Patient has blood pressure machine with arm cuff at home.  She notes that her blood pressures running a little bit elevated at home, even when she is taking blood pressure medication regularly.  Patient has been on Crestor thiazide 12.5 mg daily..  She is not sure how accurate her machine is.  She has been out of all her medicaitons for the last 2 weeks.     Patient has contact dermatitis on her hands to multiple hair products/agents.  She uses steroid cream as needed for this.    Objective   Vital Signs:   /88   Pulse 92   Ht 162.6 cm (64\")   Wt 113 kg (249 lb)   SpO2 99%   BMI 42.74 kg/m²     Physical Exam  Vitals signs reviewed.   Constitutional:       General: She is not in acute distress.     Appearance: She is well-developed.   HENT:      Head: Normocephalic and atraumatic.   Neck:      Musculoskeletal: Neck supple.   Cardiovascular:      Rate and Rhythm: Normal rate and regular rhythm.      Heart sounds: Normal heart sounds. No murmur.   Pulmonary:      Effort: Pulmonary effort is normal. No respiratory distress.      Breath sounds: Normal breath sounds. No wheezing or rales.   Abdominal:      Palpations: Abdomen is soft.      " Tenderness: There is no abdominal tenderness.   Musculoskeletal:      Thoracic back: She exhibits decreased range of motion and tenderness.   Skin:     General: Skin is warm and dry.      Findings: No rash.   Neurological:      Mental Status: She is alert and oriented to person, place, and time.        Result Review :   The following data was reviewed by: Nisha Clark MD on 02/01/2021:    CMP, lipid panel, thyroid studies and CBC from 5/29/2020 reviewed  Hemoglobin A1c from 6/2/2020 reviewed              Assessment and Plan    Problem List Items Addressed This Visit        Cardiac and Vasculature    Essential hypertension - Primary    Relevant Medications    lisinopril-hydrochlorothiazide (Zestoretic) 10-12.5 MG per tablet    Other Relevant Orders    Lipid Panel (Completed)    CBC & Differential (Completed)    Comprehensive Metabolic Panel (Completed)    TSH (Completed)    T4, free (Completed)       Endocrine and Metabolic    Morbid obesity due to excess calories (CMS/HCC)    Prediabetes    Relevant Orders    Hemoglobin A1c (Completed)      Other Visit Diagnoses     Need for hepatitis C screening test        Relevant Orders    Hepatitis C antibody (Completed)    Encounter for screening mammogram for malignant neoplasm of breast        Relevant Orders    Mammo Screening Digital Tomosynthesis Bilateral With CAD    Spondylosis of thoracic region without myelopathy or radiculopathy        Relevant Medications    naproxen (Naprosyn) 500 MG tablet    Encounter to establish care            Patient presents today to establish care  Blood pressure elevated, even when patient is taking medication regularly  Will restart antihypertensive medication  Start lisinopril-hydrochlorothiazide 10-12.5 mg daily  We will check CBC, CMP and lipid panel    Last hemoglobin A1c elevated at 6.3%  This finding is consistent with prediabetes  Lifestyle modification with dietary changes and increased physical activity discussed  Body mass  index is 42.74 kg/m².   These modifications will also help with weight loss, as BMI is too high  We will repeat hemoglobin A1c today    Patient due for hep C screening and mammogram for screening breast cancer, ordered today  We will trial naproxen 500 mg twice daily for chronic thoracic back pain  Patient should not take previously prescribed medication low back or any other NSAIDs with this medication.  Okay to use Tylenol in addition if needed.   Ice or heat to thoracic spine may help as well.      Follow Up   Return in about 4 weeks (around 3/1/2021) for Recheck.    Patient was given instructions and counseling regarding her condition or for health maintenance advice. Please see specific information pulled into the AVS if appropriate.         This document has been electronically signed by Nisha Clark MD on February 1, 2021 14:56 CST

## 2021-02-01 NOTE — TELEPHONE ENCOUNTER
Pt returned my phone call.  Abdominal bloating and knee pain has improved since she stopped taking micronor.  Pt wants to discuss other contraception options.  I recommended Mirena due to her history of uterine adenolysis and contraindication to estrogen.  RBA Mirena and Cytotec.  Pt is suppose to start period this week.  She has been consistently using condoms and is for certain she is not currently pregnant.  Plan to have period this week then come in next week on 02/08/2021 at 08:30 am for Mirena placement with cytotec night before IUD placement.

## 2021-02-04 ENCOUNTER — LAB (OUTPATIENT)
Dept: LAB | Facility: HOSPITAL | Age: 51
End: 2021-02-04

## 2021-02-04 ENCOUNTER — TELEPHONE (OUTPATIENT)
Dept: FAMILY MEDICINE CLINIC | Facility: CLINIC | Age: 51
End: 2021-02-04

## 2021-02-04 DIAGNOSIS — R73.03 PREDIABETES: ICD-10-CM

## 2021-02-04 DIAGNOSIS — I10 ESSENTIAL HYPERTENSION: ICD-10-CM

## 2021-02-04 DIAGNOSIS — Z11.59 NEED FOR HEPATITIS C SCREENING TEST: ICD-10-CM

## 2021-02-04 LAB
ALBUMIN SERPL-MCNC: 4.3 G/DL (ref 3.5–5.2)
ALBUMIN/GLOB SERPL: 1.5 G/DL
ALP SERPL-CCNC: 73 U/L (ref 39–117)
ALT SERPL W P-5'-P-CCNC: 18 U/L (ref 1–33)
ANION GAP SERPL CALCULATED.3IONS-SCNC: 12.1 MMOL/L (ref 5–15)
AST SERPL-CCNC: 17 U/L (ref 1–32)
BASOPHILS # BLD AUTO: 0.06 10*3/MM3 (ref 0–0.2)
BASOPHILS NFR BLD AUTO: 0.6 % (ref 0–1.5)
BILIRUB SERPL-MCNC: 0.3 MG/DL (ref 0–1.2)
BUN SERPL-MCNC: 13 MG/DL (ref 6–20)
BUN/CREAT SERPL: 24.1 (ref 7–25)
CALCIUM SPEC-SCNC: 9.5 MG/DL (ref 8.6–10.5)
CHLORIDE SERPL-SCNC: 101 MMOL/L (ref 98–107)
CHOLEST SERPL-MCNC: 156 MG/DL (ref 0–200)
CO2 SERPL-SCNC: 26.9 MMOL/L (ref 22–29)
CREAT SERPL-MCNC: 0.54 MG/DL (ref 0.57–1)
DEPRECATED RDW RBC AUTO: 41 FL (ref 37–54)
EOSINOPHIL # BLD AUTO: 0.2 10*3/MM3 (ref 0–0.4)
EOSINOPHIL NFR BLD AUTO: 1.9 % (ref 0.3–6.2)
ERYTHROCYTE [DISTWIDTH] IN BLOOD BY AUTOMATED COUNT: 13.9 % (ref 12.3–15.4)
GFR SERPL CREATININE-BSD FRML MDRD: 120 ML/MIN/1.73
GLOBULIN UR ELPH-MCNC: 2.8 GM/DL
GLUCOSE SERPL-MCNC: 110 MG/DL (ref 65–99)
HBA1C MFR BLD: 6.3 % (ref 4.8–5.6)
HCT VFR BLD AUTO: 38.2 % (ref 34–46.6)
HCV AB SER DONR QL: NORMAL
HDLC SERPL-MCNC: 49 MG/DL (ref 40–60)
HGB BLD-MCNC: 13.1 G/DL (ref 12–15.9)
IMM GRANULOCYTES # BLD AUTO: 0.05 10*3/MM3 (ref 0–0.05)
IMM GRANULOCYTES NFR BLD AUTO: 0.5 % (ref 0–0.5)
LDLC SERPL CALC-MCNC: 97 MG/DL (ref 0–100)
LDLC/HDLC SERPL: 1.98 {RATIO}
LYMPHOCYTES # BLD AUTO: 2.58 10*3/MM3 (ref 0.7–3.1)
LYMPHOCYTES NFR BLD AUTO: 24.8 % (ref 19.6–45.3)
MCH RBC QN AUTO: 28.1 PG (ref 26.6–33)
MCHC RBC AUTO-ENTMCNC: 34.3 G/DL (ref 31.5–35.7)
MCV RBC AUTO: 82 FL (ref 79–97)
MONOCYTES # BLD AUTO: 0.62 10*3/MM3 (ref 0.1–0.9)
MONOCYTES NFR BLD AUTO: 6 % (ref 5–12)
NEUTROPHILS NFR BLD AUTO: 6.9 10*3/MM3 (ref 1.7–7)
NEUTROPHILS NFR BLD AUTO: 66.2 % (ref 42.7–76)
NRBC BLD AUTO-RTO: 0 /100 WBC (ref 0–0.2)
PLATELET # BLD AUTO: 415 10*3/MM3 (ref 140–450)
PMV BLD AUTO: 9.5 FL (ref 6–12)
POTASSIUM SERPL-SCNC: 4.1 MMOL/L (ref 3.5–5.2)
PROT SERPL-MCNC: 7.1 G/DL (ref 6–8.5)
RBC # BLD AUTO: 4.66 10*6/MM3 (ref 3.77–5.28)
SODIUM SERPL-SCNC: 140 MMOL/L (ref 136–145)
T4 FREE SERPL-MCNC: 1 NG/DL (ref 0.93–1.7)
TRIGL SERPL-MCNC: 49 MG/DL (ref 0–150)
TSH SERPL DL<=0.05 MIU/L-ACNC: 2.55 UIU/ML (ref 0.27–4.2)
VLDLC SERPL-MCNC: 10 MG/DL (ref 5–40)
WBC # BLD AUTO: 10.41 10*3/MM3 (ref 3.4–10.8)

## 2021-02-04 PROCEDURE — 84443 ASSAY THYROID STIM HORMONE: CPT

## 2021-02-04 PROCEDURE — 36415 COLL VENOUS BLD VENIPUNCTURE: CPT

## 2021-02-04 PROCEDURE — 85025 COMPLETE CBC W/AUTO DIFF WBC: CPT

## 2021-02-04 PROCEDURE — 86803 HEPATITIS C AB TEST: CPT

## 2021-02-04 PROCEDURE — 80061 LIPID PANEL: CPT

## 2021-02-04 PROCEDURE — 80053 COMPREHEN METABOLIC PANEL: CPT

## 2021-02-04 PROCEDURE — 84439 ASSAY OF FREE THYROXINE: CPT

## 2021-02-04 PROCEDURE — 83036 HEMOGLOBIN GLYCOSYLATED A1C: CPT

## 2021-02-08 ENCOUNTER — PROCEDURE VISIT (OUTPATIENT)
Dept: OBSTETRICS AND GYNECOLOGY | Facility: CLINIC | Age: 51
End: 2021-02-08

## 2021-02-08 VITALS
HEIGHT: 64 IN | BODY MASS INDEX: 42.68 KG/M2 | WEIGHT: 250 LBS | SYSTOLIC BLOOD PRESSURE: 142 MMHG | DIASTOLIC BLOOD PRESSURE: 82 MMHG

## 2021-02-08 DIAGNOSIS — Z30.017 NEXPLANON INSERTION: Primary | ICD-10-CM

## 2021-02-08 PROCEDURE — 11981 INSERTION DRUG DLVR IMPLANT: CPT | Performed by: NURSE PRACTITIONER

## 2021-02-08 NOTE — PROGRESS NOTES
Nexplanon Insertion    No LMP recorded (lmp unknown). UPT negative.    Date of procedure:  2/8/2021    Risks and benefits discussed? yes  All questions answered? yes  Consents given by the patient  Written consent obtained? yes    Local anesthesia used:  Yes, 3 cc's of lidocaine    Procedure documentation:    The upper left arm (non-dominant) was marked at the intended site of insertion. The skin was cleansed with an antiseptic solution.  Local anesthesia was injected.  The Nexplanon was placed subdermally without difficulty.  The devise was able to be palpated in the arm by both myself and Josie.  The site was cleansed then a 4x4 clean gauze was place over the site of insertion and wrapped with gauze.     She tolerated the procedure well.  There were no complications.  EBL was minimal.    Nexplanon  [id number]    Post procedure instructions: Remove the wrapping in 24 hours and cover with a band aid if still open.    Follow up needed: PRN    This note was electronically signed.    SONI Stratton  February 8, 2021

## 2021-03-01 ENCOUNTER — OFFICE VISIT (OUTPATIENT)
Dept: FAMILY MEDICINE CLINIC | Facility: CLINIC | Age: 51
End: 2021-03-01

## 2021-03-01 VITALS
BODY MASS INDEX: 42.65 KG/M2 | SYSTOLIC BLOOD PRESSURE: 148 MMHG | HEIGHT: 64 IN | DIASTOLIC BLOOD PRESSURE: 80 MMHG | OXYGEN SATURATION: 99 % | HEART RATE: 85 BPM | WEIGHT: 249.8 LBS

## 2021-03-01 DIAGNOSIS — R32 URINARY INCONTINENCE, UNSPECIFIED TYPE: ICD-10-CM

## 2021-03-01 DIAGNOSIS — R73.03 PREDIABETES: ICD-10-CM

## 2021-03-01 DIAGNOSIS — M47.814 SPONDYLOSIS OF THORACIC REGION WITHOUT MYELOPATHY OR RADICULOPATHY: ICD-10-CM

## 2021-03-01 DIAGNOSIS — I10 ESSENTIAL HYPERTENSION: Primary | ICD-10-CM

## 2021-03-01 PROCEDURE — 99214 OFFICE O/P EST MOD 30 MIN: CPT | Performed by: FAMILY MEDICINE

## 2021-03-01 RX ORDER — HYDROCHLOROTHIAZIDE 12.5 MG/1
12.5 TABLET ORAL DAILY
Qty: 30 TABLET | Refills: 2 | Status: SHIPPED | OUTPATIENT
Start: 2021-03-01 | End: 2021-03-01

## 2021-03-01 RX ORDER — LOSARTAN POTASSIUM 50 MG/1
50 TABLET ORAL DAILY
Qty: 30 TABLET | Refills: 2 | Status: SHIPPED | OUTPATIENT
Start: 2021-03-01 | End: 2021-06-01

## 2021-03-01 NOTE — PATIENT INSTRUCTIONS
Oxybutynin extended-release tablets  What is this medicine?  OXYBUTYNIN (ox i BYOO ti gian) is used to treat overactive bladder. This medicine reduces the amount of bathroom visits. It may also help to control wetting accidents.  This medicine may be used for other purposes; ask your health care provider or pharmacist if you have questions.  COMMON BRAND NAME(S): Ditropan XL  What should I tell my health care provider before I take this medicine?  They need to know if you have any of these conditions:  · autonomic neuropathy  · dementia  · difficulty passing urine  · glaucoma  · intestinal obstruction  · kidney disease  · liver disease  · myasthenia gravis  · Parkinson's disease  · an unusual or allergic reaction to oxybutynin, other medicines, foods, dyes, or preservatives  · pregnant or trying to get pregnant  · breast-feeding  How should I use this medicine?  Take this medicine by mouth with a glass of water. Swallow whole, do not crush, cut, or chew. Follow the directions on the prescription label. You can take this medicine with or without food. Take your doses at regular intervals. Do not take your medicine more often than directed.  Talk to your pediatrician regarding the use of this medicine in children. Special care may be needed. While this drug may be prescribed for children as young as 6 years for selected conditions, precautions do apply.  Overdosage: If you think you have taken too much of this medicine contact a poison control center or emergency room at once.  NOTE: This medicine is only for you. Do not share this medicine with others.  What if I miss a dose?  If you miss a dose, take it as soon as you can. If it is almost time for your next dose, take only that dose. Do not take double or extra doses.  What may interact with this medicine?  · antihistamines for allergy, cough and cold  · atropine  · certain medicines for bladder problems like oxybutynin, tolterodine  · certain medicines for  Parkinson's disease like benztropine, trihexyphenidyl  · certain medicines for stomach problems like dicyclomine, hyoscyamine  · certain medicines for travel sickness like scopolamine  · clarithromycin  · erythromycin  · ipratropium  · medicines for fungal infections, like fluconazole, itraconazole, ketoconazole or voriconazole  This list may not describe all possible interactions. Give your health care provider a list of all the medicines, herbs, non-prescription drugs, or dietary supplements you use. Also tell them if you smoke, drink alcohol, or use illegal drugs. Some items may interact with your medicine.  What should I watch for while using this medicine?  It may take a few weeks to notice the full benefit from this medicine.  You may need to limit your intake tea, coffee, caffeinated sodas, and alcohol. These drinks may make your symptoms worse.  You may get drowsy or dizzy. Do not drive, use machinery, or do anything that needs mental alertness until you know how this medicine affects you. Do not stand or sit up quickly, especially if you are an older patient. This reduces the risk of dizzy or fainting spells. Alcohol may interfere with the effect of this medicine. Avoid alcoholic drinks.  Your mouth may get dry. Chewing sugarless gum or sucking hard candy, and drinking plenty of water may help. Contact your doctor if the problem does not go away or is severe.  This medicine may cause dry eyes and blurred vision. If you wear contact lenses, you may feel some discomfort. Lubricating drops may help. See your eyecare professional if the problem does not go away or is severe.  You may notice the shells of the tablets in your stool from time to time. This is normal.  Avoid extreme heat. This medicine can cause you to sweat less than normal. Your body temperature could increase to dangerous levels, which may lead to heat stroke.  What side effects may I notice from receiving this medicine?  Side effects that you  should report to your doctor or health care professional as soon as possible:  · allergic reactions like skin rash, itching or hives, swelling of the face, lips, or tongue  · agitation  · breathing problems  · confusion  · fever  · flushing (reddening of the skin)  · hallucinations  · memory loss  · pain or difficulty passing urine  · palpitations  · unusually weak or tired  Side effects that usually do not require medical attention (report to your doctor or health care professional if they continue or are bothersome):  · constipation  · headache  · sexual difficulties (impotence)  This list may not describe all possible side effects. Call your doctor for medical advice about side effects. You may report side effects to FDA at 9-356-FDA-3840.  Where should I keep my medicine?  Keep out of the reach of children.  Store at room temperature between 15 and 30 degrees C (59 and 86 degrees F). Protect from moisture and humidity. Throw away any unused medicine after the expiration date.  NOTE: This sheet is a summary. It may not cover all possible information. If you have questions about this medicine, talk to your doctor, pharmacist, or health care provider.  © 2021 [employer] (2015-03-05 10:57:06)

## 2021-03-01 NOTE — PROGRESS NOTES
"Chief Complaint  Hypertension    Subjective          Josie Waller presents to [facility]  History of Present Illness    Patient presents today for follow-up.  She notes that her systolic blood pressure still been elevated on the top.  Prior to last visit, she was taking hydrochlorothiazide 12.5 mg daily.  Lisinopril 10 mg added at last visit.  Patient has been checking her blood pressure regularly at home.  Notes that she has had a dry cough since starting the lisinopril, nonproductive.    Patient reports problems with urinary incontinence.  She has loss of urine with coughing, sneezing, etc.  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  She has been to pelvic therapy before, and felt like this was helping some.  She has not been doing these exercises regularly.  She notes that she has to wear a small pad for leaks.  Patient does avoid bladder irritants, and this helps some.  Does not do any timed bathroom breaks.  Patient has not been on medication for this before.  Patient notes some increase fluid during sexual intercourse.  She is not certain whether or not this is urine, but has some concerns that it may be.  Patient says that this increase fluid is making her uncomfortable.    Patient notes that the naproxen is helping significantly with her neck pain.       Objective   Vital Signs:   /80   Pulse 85   Ht 162.6 cm (64\")   Wt 113 kg (249 lb 12.8 oz)   SpO2 99%   BMI 42.88 kg/m²     Physical Exam  Vitals signs reviewed.   Constitutional:       General: She is not in acute distress.     Appearance: She is well-developed.   HENT:      Head: Normocephalic and atraumatic.   Neck:      Musculoskeletal: Neck supple.   Cardiovascular:      Rate and Rhythm: Normal rate and regular rhythm.      Heart sounds: Normal heart sounds. No murmur.   Pulmonary:      Effort: Pulmonary effort is " normal. No respiratory distress.      Breath sounds: Normal breath sounds. No wheezing or rales.   Abdominal:      Palpations: Abdomen is soft.      Tenderness: There is no abdominal tenderness.   Skin:     General: Skin is warm and dry.      Findings: No rash.   Neurological:      Mental Status: She is alert and oriented to person, place, and time.        Result Review :   The following data was reviewed by: Nisha Clark MD on 03/01/2021:  Common labs    Common Labsle 2/4/21 2/4/21 2/4/21 2/4/21    0916 0916 0916 0916   Glucose  110 (A)     BUN  13     Creatinine  0.54 (A)     eGFR Non African Am  120     Sodium  140     Potassium  4.1     Chloride  101     Calcium  9.5     Albumin  4.30     Total Bilirubin  0.3     Alkaline Phosphatase  73     AST (SGOT)  17     ALT (SGPT)  18     WBC 10.41      Hemoglobin 13.1      Hematocrit 38.2      Platelets 415      Total Cholesterol   156    Triglycerides   49    HDL Cholesterol   49    LDL Cholesterol    97    Hemoglobin A1C    6.30 (A)   (A) Abnormal value       Comments are available for some flowsheets but are not being displayed.                     Assessment and Plan    Diagnoses and all orders for this visit:    1. Essential hypertension (Primary)  -     losartan (Cozaar) 50 MG tablet; Take 1 tablet by mouth Daily.  Dispense: 30 tablet; Refill: 2    2. Urinary incontinence, unspecified type    3. Prediabetes    4. Spondylosis of thoracic region without myelopathy or radiculopathy      Patient presents today for follow-up.  Having a cough from lisinopril.  Will discontinue lisinopril.  We will also discontinue hydrochlorothiazide, as patient is having trouble with urinary frequency and urinary incontinence.  Will start losartan 50 mg daily.  Patient should monitor her blood pressure closely over the next few days, as we may need to increase this dose since adjusting antihypertensive therapy.  If patient has swelling, may need to add diuretic back in.  Patient  having urinary incontinence, most likely mixture of stress and urge incontinence.  May be noticing some bladder spasms as well.  Encouraged pelvic exercises and timed bathroom breaks for the next 2 weeks to see if this improves incontinence.  If not, will plan to discuss addition of medication.  Patient advised that surgical options were sometimes available as well.  Patient should continue with lifestyle modification for prediabetes.  She notes that she recently got an elliptical, and plans to start working out.  Naproxen helps with upper back/neck pain.  She will continue this medication.  We will plan to monitor kidney function closely.  Options for colon cancer screening discussed, patient declines today.  We will plan to readdress next year, and patient advised to let me know if she would like referral or Cologuard screening sooner than this.      Follow Up   Return in about 3 months (around 6/1/2021) for Recheck.  Patient was given instructions and counseling regarding her condition or for health maintenance advice. Please see specific information pulled into the AVS if appropriate.               This document has been electronically signed by Nisha Clark MD

## 2021-03-08 ENCOUNTER — TELEPHONE (OUTPATIENT)
Dept: FAMILY MEDICINE CLINIC | Facility: CLINIC | Age: 51
End: 2021-03-08

## 2021-03-08 NOTE — TELEPHONE ENCOUNTER
Josie called with her b/p readings all in the AM    03-02-21   131/71  03-03-21   134/67  03-04-21   121/62  03-05-21   145/71  03-06-21   145/79  03-07-21   140/74  03-08-21   149/87

## 2021-03-09 NOTE — TELEPHONE ENCOUNTER
Per Dr. Clark, Ms. Waller has been called and recommendations relayed.  She states understanding of the information relayed

## 2021-03-09 NOTE — TELEPHONE ENCOUNTER
Please ask that patient monitor for one additional week.  If still having some blood pressure readings greater than 140 on the top, increase losartan to 100 mg daily (2 tabs) and call.  Thanks, JING Clark

## 2021-03-29 DIAGNOSIS — M47.814 SPONDYLOSIS OF THORACIC REGION WITHOUT MYELOPATHY OR RADICULOPATHY: ICD-10-CM

## 2021-03-29 RX ORDER — NAPROXEN 500 MG/1
TABLET ORAL
Qty: 60 TABLET | Refills: 0 | Status: SHIPPED | OUTPATIENT
Start: 2021-03-29 | End: 2021-04-26

## 2021-04-02 ENCOUNTER — TELEPHONE (OUTPATIENT)
Dept: OBSTETRICS AND GYNECOLOGY | Facility: CLINIC | Age: 51
End: 2021-04-02

## 2021-04-26 DIAGNOSIS — M47.814 SPONDYLOSIS OF THORACIC REGION WITHOUT MYELOPATHY OR RADICULOPATHY: ICD-10-CM

## 2021-04-26 RX ORDER — NAPROXEN 500 MG/1
TABLET ORAL
Qty: 60 TABLET | Refills: 0 | Status: SHIPPED | OUTPATIENT
Start: 2021-04-26 | End: 2021-06-01

## 2021-05-25 ENCOUNTER — OFFICE VISIT (OUTPATIENT)
Dept: FAMILY MEDICINE CLINIC | Facility: CLINIC | Age: 51
End: 2021-05-25

## 2021-05-25 ENCOUNTER — LAB (OUTPATIENT)
Dept: LAB | Facility: HOSPITAL | Age: 51
End: 2021-05-25

## 2021-05-25 VITALS
BODY MASS INDEX: 42.51 KG/M2 | HEART RATE: 88 BPM | WEIGHT: 249 LBS | OXYGEN SATURATION: 99 % | SYSTOLIC BLOOD PRESSURE: 138 MMHG | DIASTOLIC BLOOD PRESSURE: 74 MMHG | HEIGHT: 64 IN

## 2021-05-25 DIAGNOSIS — G62.9 NEUROPATHY: ICD-10-CM

## 2021-05-25 DIAGNOSIS — I10 ESSENTIAL HYPERTENSION: Primary | ICD-10-CM

## 2021-05-25 DIAGNOSIS — R73.03 PREDIABETES: ICD-10-CM

## 2021-05-25 PROCEDURE — 84443 ASSAY THYROID STIM HORMONE: CPT

## 2021-05-25 PROCEDURE — 36415 COLL VENOUS BLD VENIPUNCTURE: CPT

## 2021-05-25 PROCEDURE — 99213 OFFICE O/P EST LOW 20 MIN: CPT | Performed by: FAMILY MEDICINE

## 2021-05-25 PROCEDURE — 83036 HEMOGLOBIN GLYCOSYLATED A1C: CPT

## 2021-05-25 PROCEDURE — 82607 VITAMIN B-12: CPT

## 2021-05-25 RX ORDER — HYDROCHLOROTHIAZIDE 12.5 MG/1
12.5 TABLET ORAL DAILY
COMMUNITY
Start: 2021-03-01 | End: 2021-05-28

## 2021-05-26 ENCOUNTER — TELEPHONE (OUTPATIENT)
Dept: FAMILY MEDICINE CLINIC | Facility: CLINIC | Age: 51
End: 2021-05-26

## 2021-05-26 DIAGNOSIS — I10 ESSENTIAL HYPERTENSION: Primary | ICD-10-CM

## 2021-05-26 LAB
HBA1C MFR BLD: 5.7 % (ref 4.8–5.6)
TSH SERPL DL<=0.05 MIU/L-ACNC: 2.7 UIU/ML (ref 0.27–4.2)
VIT B12 BLD-MCNC: 524 PG/ML (ref 211–946)

## 2021-05-26 NOTE — TELEPHONE ENCOUNTER
Please call and let patient know that thyroid and B12 labs normal.  HgbA1c has improved, still in prediabetes range.  ThanksJING

## 2021-05-27 NOTE — TELEPHONE ENCOUNTER
Per Dr. Clark, Ms Waller has been called with recent test results and recommendations to continue current medications and follow-up as planned or sooner if any problems.    Dr. Clark,     Ms. Waller said her B/P has been running in the 130's over 70's since her last visit and since she was taken off her water pill.  She mentioned the Lisinopril/HCTZ. Looks like it was DC 03/01/2021  She states she has noticed increased swelling in her hands, feet and legs.     Her med list indicates an HCTZ script but it is from an outside source and has not been updated.    She uses [facility]     Please Advise    AKIN Dugan

## 2021-05-28 RX ORDER — HYDROCHLOROTHIAZIDE 12.5 MG/1
12.5 TABLET ORAL DAILY
Qty: 30 TABLET | Refills: 5 | Status: SHIPPED | OUTPATIENT
Start: 2021-05-28 | End: 2021-11-29

## 2021-05-28 NOTE — TELEPHONE ENCOUNTER
Per Dr. Clark, Ms. Waller has been called with medication recommendations, script has been sent to her pharmacy.

## 2021-05-28 NOTE — TELEPHONE ENCOUNTER
We stopped HCTZ due to complaints of urinary frequency a couple of months ago.  I can send to the pharmacy again to be used as needed.  Thanks, JING Clark

## 2021-05-30 DIAGNOSIS — M47.814 SPONDYLOSIS OF THORACIC REGION WITHOUT MYELOPATHY OR RADICULOPATHY: ICD-10-CM

## 2021-05-30 DIAGNOSIS — I10 ESSENTIAL HYPERTENSION: ICD-10-CM

## 2021-06-01 RX ORDER — NAPROXEN 500 MG/1
TABLET ORAL
Qty: 60 TABLET | Refills: 0 | Status: SHIPPED | OUTPATIENT
Start: 2021-06-01 | End: 2021-06-28

## 2021-06-01 RX ORDER — LOSARTAN POTASSIUM 50 MG/1
TABLET ORAL
Qty: 30 TABLET | Refills: 5 | Status: SHIPPED | OUTPATIENT
Start: 2021-06-01 | End: 2021-11-29

## 2021-06-18 ENCOUNTER — TELEPHONE (OUTPATIENT)
Dept: FAMILY MEDICINE CLINIC | Facility: CLINIC | Age: 51
End: 2021-06-18

## 2021-06-28 DIAGNOSIS — M47.814 SPONDYLOSIS OF THORACIC REGION WITHOUT MYELOPATHY OR RADICULOPATHY: ICD-10-CM

## 2021-06-28 RX ORDER — NAPROXEN 500 MG/1
TABLET ORAL
Qty: 60 TABLET | Refills: 0 | Status: SHIPPED | OUTPATIENT
Start: 2021-06-28 | End: 2021-08-02

## 2021-07-02 ENCOUNTER — HOSPITAL ENCOUNTER (OUTPATIENT)
Dept: GENERAL RADIOLOGY | Facility: HOSPITAL | Age: 51
Discharge: HOME | End: 2021-07-02

## 2021-07-02 ENCOUNTER — TELEPHONE (OUTPATIENT)
Dept: FAMILY MEDICINE CLINIC | Facility: CLINIC | Age: 51
End: 2021-07-02

## 2021-07-02 ENCOUNTER — HOSPITAL ENCOUNTER (OUTPATIENT)
Dept: ULTRASOUND IMAGING | Facility: HOSPITAL | Age: 51
Discharge: HOME | End: 2021-07-02

## 2021-07-02 ENCOUNTER — OFFICE VISIT (OUTPATIENT)
Dept: FAMILY MEDICINE CLINIC | Facility: CLINIC | Age: 51
End: 2021-07-02

## 2021-07-02 VITALS
WEIGHT: 248 LBS | SYSTOLIC BLOOD PRESSURE: 130 MMHG | HEIGHT: 64 IN | OXYGEN SATURATION: 97 % | DIASTOLIC BLOOD PRESSURE: 64 MMHG | HEART RATE: 70 BPM | BODY MASS INDEX: 42.34 KG/M2

## 2021-07-02 DIAGNOSIS — M54.9 CHRONIC MIDLINE BACK PAIN, UNSPECIFIED BACK LOCATION: ICD-10-CM

## 2021-07-02 DIAGNOSIS — G89.29 CHRONIC MIDLINE BACK PAIN, UNSPECIFIED BACK LOCATION: ICD-10-CM

## 2021-07-02 DIAGNOSIS — M79.604 ACUTE PAIN OF RIGHT LOWER EXTREMITY: ICD-10-CM

## 2021-07-02 DIAGNOSIS — M79.604 ACUTE PAIN OF RIGHT LOWER EXTREMITY: Primary | ICD-10-CM

## 2021-07-02 PROCEDURE — 99213 OFFICE O/P EST LOW 20 MIN: CPT | Performed by: FAMILY MEDICINE

## 2021-07-02 PROCEDURE — 72072 X-RAY EXAM THORAC SPINE 3VWS: CPT

## 2021-07-02 PROCEDURE — 93971 EXTREMITY STUDY: CPT

## 2021-07-02 PROCEDURE — 72110 X-RAY EXAM L-2 SPINE 4/>VWS: CPT

## 2021-07-02 NOTE — TELEPHONE ENCOUNTER
Called to speak with patient.  US showed no DVT.  Xray Thoracic spine showed calcification consistent with DISH (diffuse idiopathic skeletal hyperostosis).  Discussed this finding and provided additional information via [employer]. She will contact next week with any questions.  Thanks, JING Clark

## 2021-07-02 NOTE — PROGRESS NOTES
"Chief Complaint  Cyst (behind right knee)    Subjective          Josie Waller presents to [facility]  History of Present Illness    Patient presents today for pain in her right lower extremity/right knee.  Pain started about 2 days ago.  She has an area of tenderness that has not resolved after bruising, still a small mass on the right knee.  Patient has concerns for blood clot.      Patient also has some pain/discomfot in her back.  Says that she feels it in her spine.  Hard to describe, but is like a \"bubble feeling\" intermittently in her mid and lower back.  She has chronic pain in her cervical spine but the sensation that she has lower is not the same.    Objective   Vital Signs:   /64   Pulse 70   Ht 162.6 cm (64\")   Wt 112 kg (248 lb)   SpO2 97%   BMI 42.57 kg/m²     Physical Exam  Vitals reviewed.   Constitutional:       General: She is not in acute distress.     Appearance: She is well-developed.   HENT:      Head: Normocephalic and atraumatic.   Cardiovascular:      Rate and Rhythm: Normal rate and regular rhythm.      Heart sounds: Normal heart sounds. No murmur heard.     Pulmonary:      Effort: Pulmonary effort is normal. No respiratory distress.      Breath sounds: Normal breath sounds. No wheezing or rales.   Abdominal:      Palpations: Abdomen is soft.      Tenderness: There is no abdominal tenderness.   Musculoskeletal:      Comments: Small subcutaneous masses on posteromedial aspect of right knee, no significant erythema, some tenderness of right lower extremity   Skin:     General: Skin is warm and dry.   Neurological:      Mental Status: She is alert and oriented to person, place, and time.        Result Review :   The following data was reviewed by: Nisha Clark MD on 07/02/2021:  Common labs    Common Labsle 2/4/21 2/4/21 2/4/21 2/4/21 5/25/21    0916 0916 0916 0916    Glucose  110 (A)      BUN  13      Creatinine  0.54 (A)      eGFR " Non African Am  120      Sodium  140      Potassium  4.1      Chloride  101      Calcium  9.5      Albumin  4.30      Total Bilirubin  0.3      Alkaline Phosphatase  73      AST (SGOT)  17      ALT (SGPT)  18      WBC 10.41       Hemoglobin 13.1       Hematocrit 38.2       Platelets 415       Total Cholesterol   156     Triglycerides   49     HDL Cholesterol   49     LDL Cholesterol    97     Hemoglobin A1C    6.30 (A) 5.70 (A)   (A) Abnormal value                      Assessment and Plan    Diagnoses and all orders for this visit:    1. Acute pain of right lower extremity (Primary)  -     US venous doppler lower extremity right (duplex); Future    2. Chronic midline back pain, unspecified back location  -     XR Spine Thoracic 3 View; Future  -     XR Spine Lumbar 4+ View; Future      Patient presents today with concerns of pain/swelling behind left knee  Will obtain ultrasound to make sure no DVT  Encouraged ice/heat and elevation to help with swelling  May just need additional time for resolving bruise  Will get baseline Xray of thoracic and lumbar spine for evaluation            Follow Up   Return in about 8 weeks (around 8/27/2021) for Recheck.  Patient was given instructions and counseling regarding her condition or for health maintenance advice. Please see specific information pulled into the AVS if appropriate.         This document has been electronically signed by Nisha Clark MD

## 2021-08-01 DIAGNOSIS — M47.814 SPONDYLOSIS OF THORACIC REGION WITHOUT MYELOPATHY OR RADICULOPATHY: ICD-10-CM

## 2021-08-02 RX ORDER — NAPROXEN 500 MG/1
TABLET ORAL
Qty: 60 TABLET | Refills: 0 | Status: SHIPPED | OUTPATIENT
Start: 2021-08-02 | End: 2021-08-30

## 2021-08-25 ENCOUNTER — OFFICE VISIT (OUTPATIENT)
Dept: FAMILY MEDICINE CLINIC | Facility: CLINIC | Age: 51
End: 2021-08-25

## 2021-08-25 VITALS
OXYGEN SATURATION: 98 % | WEIGHT: 242.8 LBS | HEIGHT: 64 IN | HEART RATE: 78 BPM | BODY MASS INDEX: 41.45 KG/M2 | DIASTOLIC BLOOD PRESSURE: 60 MMHG | SYSTOLIC BLOOD PRESSURE: 150 MMHG

## 2021-08-25 DIAGNOSIS — I10 ESSENTIAL HYPERTENSION: ICD-10-CM

## 2021-08-25 DIAGNOSIS — G56.03 CARPAL TUNNEL SYNDROME, BILATERAL: Primary | ICD-10-CM

## 2021-08-25 PROCEDURE — 99213 OFFICE O/P EST LOW 20 MIN: CPT | Performed by: FAMILY MEDICINE

## 2021-08-25 RX ORDER — DIPHENOXYLATE HYDROCHLORIDE AND ATROPINE SULFATE 2.5; .025 MG/1; MG/1
1 TABLET ORAL DAILY
COMMUNITY

## 2021-08-29 DIAGNOSIS — M47.814 SPONDYLOSIS OF THORACIC REGION WITHOUT MYELOPATHY OR RADICULOPATHY: ICD-10-CM

## 2021-08-30 RX ORDER — NAPROXEN 500 MG/1
TABLET ORAL
Qty: 60 TABLET | Refills: 0 | Status: SHIPPED | OUTPATIENT
Start: 2021-08-30 | End: 2021-09-27

## 2021-09-27 DIAGNOSIS — M47.814 SPONDYLOSIS OF THORACIC REGION WITHOUT MYELOPATHY OR RADICULOPATHY: ICD-10-CM

## 2021-09-27 RX ORDER — NAPROXEN 500 MG/1
TABLET ORAL
Qty: 60 TABLET | Refills: 0 | Status: SHIPPED | OUTPATIENT
Start: 2021-09-27 | End: 2021-10-26

## 2021-10-26 DIAGNOSIS — M47.814 SPONDYLOSIS OF THORACIC REGION WITHOUT MYELOPATHY OR RADICULOPATHY: ICD-10-CM

## 2021-10-26 RX ORDER — NAPROXEN 500 MG/1
TABLET ORAL
Qty: 60 TABLET | Refills: 0 | Status: SHIPPED | OUTPATIENT
Start: 2021-10-26 | End: 2021-11-22

## 2021-11-22 DIAGNOSIS — M47.814 SPONDYLOSIS OF THORACIC REGION WITHOUT MYELOPATHY OR RADICULOPATHY: ICD-10-CM

## 2021-11-22 RX ORDER — NAPROXEN 500 MG/1
TABLET ORAL
Qty: 60 TABLET | Refills: 0 | Status: SHIPPED | OUTPATIENT
Start: 2021-11-22 | End: 2021-12-27

## 2021-11-29 DIAGNOSIS — I10 ESSENTIAL HYPERTENSION: ICD-10-CM

## 2021-11-29 RX ORDER — LOSARTAN POTASSIUM 50 MG/1
TABLET ORAL
Qty: 30 TABLET | Refills: 0 | Status: SHIPPED | OUTPATIENT
Start: 2021-11-29 | End: 2021-12-27

## 2021-11-29 RX ORDER — HYDROCHLOROTHIAZIDE 12.5 MG/1
TABLET ORAL
Qty: 30 TABLET | Refills: 0 | Status: SHIPPED | OUTPATIENT
Start: 2021-11-29 | End: 2022-01-10

## 2021-12-01 DIAGNOSIS — M25.531 BILATERAL WRIST PAIN: Primary | ICD-10-CM

## 2021-12-01 DIAGNOSIS — M25.532 BILATERAL WRIST PAIN: Primary | ICD-10-CM

## 2021-12-02 ENCOUNTER — OFFICE VISIT (OUTPATIENT)
Dept: ORTHOPEDIC SURGERY | Facility: CLINIC | Age: 51
End: 2021-12-02

## 2021-12-02 VITALS — BODY MASS INDEX: 40.46 KG/M2 | WEIGHT: 237 LBS | HEIGHT: 64 IN

## 2021-12-02 DIAGNOSIS — M25.532 BILATERAL WRIST PAIN: ICD-10-CM

## 2021-12-02 DIAGNOSIS — I10 ESSENTIAL HYPERTENSION: ICD-10-CM

## 2021-12-02 DIAGNOSIS — M25.531 BILATERAL WRIST PAIN: ICD-10-CM

## 2021-12-02 DIAGNOSIS — R73.03 PREDIABETES: ICD-10-CM

## 2021-12-02 DIAGNOSIS — E66.01 MORBID OBESITY WITH BMI OF 40.0-44.9, ADULT (HCC): ICD-10-CM

## 2021-12-02 DIAGNOSIS — G56.03 CARPAL TUNNEL SYNDROME, BILATERAL: Primary | ICD-10-CM

## 2021-12-02 PROCEDURE — 99213 OFFICE O/P EST LOW 20 MIN: CPT | Performed by: ORTHOPAEDIC SURGERY

## 2021-12-02 NOTE — PROGRESS NOTES
Josie Waller is a 51 y.o. female   Primary provider:  Nisha Clark MD       Chief Complaint   Patient presents with   • Right Wrist - Initial Evaluation   • Left Wrist - Initial Evaluation       HISTORY OF PRESENT ILLNESS:  Patient in for initial eval of bilateral wrist pain  Xray completed upon arrival  She reports numbness and tingling in both hands.  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  She has pain with activity.  She reports pain, numbness, and tingling with driving.  She has pain and numbness and tingling that wakes her up at night.  She has been wearing wrist braces with some improvement.  She does the home exercises for stretching and finger exercises.  She continues to have symptoms that are bothersome for her.  She has had an EMG as well.      Hand Pain   Incident onset: 4 plus years ago. There was no injury mechanism. The pain is present in the left hand, left wrist, right hand and right wrist. The quality of the pain is described as aching and burning. The pain is moderate. The pain has been constant since the incident. Associated symptoms comments: swelling. Exacerbated by: driving.        CONCURRENT MEDICAL HISTORY:    Past Medical History:   Diagnosis Date   • Backache    • Contact dermatitis     from hair dye      • Encounter for gynecological examination (general) (routine) without abnormal findings    • Encounter for surveillance of contraceptive pills    • History of acute bronchitis    • History of mammography, screening 06/28/2016   • Other disorders of menstruation and other abnormal bleeding from female genital tract    • Pain in left lower leg     questionable stress fx      • Palpitations    • Paresthesia    • Paresthesia of lower extremity     right thigh      • Unspecified Symptom associated with female genital organs    • Upper respiratory infection    • Visit for gynecologic examination         Allergies   Allergen Reactions   • Lisinopril Cough   • Latex      Hand irritation   • Nickel      Hand irritation   • Other Rash     Environmental-Rubber         Current Outpatient Medications:   •  budesonide (RINOCORT AQUA) 32 MCG/ACT nasal spray, 1 spray into the nostril(s) as directed by provider Daily., Disp: , Rfl:   •  Etonogestrel (NEXPLANON) 68 MG implant subdermal implant, Inject 1 each into the appropriate area of the skin as directed by provider 1 (One) Time., Disp: , Rfl:   •  fexofenadine (ALLEGRA) 180 MG tablet, Take 180 mg by mouth Daily., Disp: , Rfl:   •  halobetasol (ULTRAVATE) 0.05 % cream, Apply  topically to the appropriate area as directed Daily As Needed for Irritation., Disp: 50 g, Rfl: 4  •  hydroCHLOROthiazide (HYDRODIURIL) 12.5 MG tablet, Take 1 tablet by mouth once daily, Disp: 30 tablet, Rfl: 0  •  losartan (COZAAR) 50 MG tablet, Take 1 tablet by mouth once daily, Disp: 30 tablet, Rfl: 0  •  montelukast (SINGULAIR) 10 MG tablet, Take 10 mg by mouth Every Night., Disp: , Rfl:   •  multivitamin (MULTI VITAMIN DAILY PO), Take 1 tablet by mouth Daily., Disp: , Rfl:   •  naproxen (NAPROSYN) 500 MG tablet, TAKE 1 TABLET BY MOUTH TWICE DAILY WITH MEALS, Disp: 60 tablet, Rfl: 0    Past Surgical History:   Procedure Laterality Date   •  SECTION  07/15/2014    Fetal distress, primary  section.   • EAR TUBES      as a child   • INJECTION OF MEDICATION  2016    Kenalog (2)    • INJECTION OF MEDICATION  2011    Solu-Medrol (1)    • PAP SMEAR  2011    Negative   • TONSILLECTOMY         Family History   Problem Relation Age of Onset   • Hypertension Mother    • Atrial fibrillation Mother    • Hypothyroidism Mother    • Lung cancer Father    • Cancer Other    • Diabetes Other    • Heart disease Other    • Hypertension Other    • Thyroid disease Other         Social History     Socioeconomic History   • Marital status:    Tobacco Use   • Smoking status:  "Never Smoker   • Smokeless tobacco: Never Used   Substance and Sexual Activity   • Alcohol use: No   • Drug use: No   • Sexual activity: Yes     Partners: Male        Review of Systems   Constitutional: Negative for chills and fever.   Respiratory: Negative.    Cardiovascular: Negative.    Gastrointestinal: Negative.    Musculoskeletal:        Bilateral hand pain   Neurological:        Numbness and tingling in both hands   All other systems reviewed and are negative.      PHYSICAL EXAMINATION:       Ht 162.6 cm (64\")   Wt 108 kg (237 lb)   BMI 40.68 kg/m²     Physical Exam  Constitutional:       General: She is not in acute distress.     Appearance: Normal appearance.   Pulmonary:      Effort: Pulmonary effort is normal. No respiratory distress.   Neurological:      Mental Status: She is alert and oriented to person, place, and time.         GAIT:     []  Normal  []  Antalgic    Assistive device: [x]  None  []  Walker     []  Crutches  []  Cane     []  Wheelchair  []  Stretcher    Right Hand Exam     Comments:  No specific tenderness.  She has good  motion but mild decrease in  strength.  She has decreased sensation in thumb index and middle fingers.  Positive carpal compression test.  Mildly positive Tinel sign.  Good distal pulses.      Left Hand Exam     Comments:  No specific tenderness.  She has good  motion but mild decrease in  strength.  She has decreased sensation in thumb index and middle fingers.  Positive carpal compression test.  Mildly positive Tinel sign.  Good distal pulses.                    ASSESSMENT:    Diagnoses and all orders for this visit:    Carpal tunnel syndrome, bilateral  -     Ambulatory Referral to Physical Therapy Evaluate and treat    Bilateral wrist pain  -     Ambulatory Referral to Physical Therapy Evaluate and treat    Prediabetes    Essential hypertension    Morbid obesity with BMI of 40.0-44.9, adult (HCC)          PLAN    PT:   Bilateral CTS.    Patient does " not want surgery right now.   See for 4-5 visits   Teach HEP   Will probably need release before too long    Long discussion was held with the patient regarding further treatment options.  We discussed that with the EMG findings that the natural history is for this to progress and for her symptoms to continue to slowly worsen.  We began the discussion for carpal tunnel release, however, the patient is not ready for surgical intervention at this time.  We discussed the possibility of carpal tunnel release in the future when she is ready.  Activity as tolerated.  Progress with physical therapy to help with mobility and improve symptoms.    Return if symptoms worsen or fail to improve, for recheck.    Junior Weinberg MD

## 2021-12-27 DIAGNOSIS — M47.814 SPONDYLOSIS OF THORACIC REGION WITHOUT MYELOPATHY OR RADICULOPATHY: ICD-10-CM

## 2021-12-27 DIAGNOSIS — I10 ESSENTIAL HYPERTENSION: ICD-10-CM

## 2021-12-27 RX ORDER — LOSARTAN POTASSIUM 50 MG/1
TABLET ORAL
Qty: 30 TABLET | Refills: 0 | Status: SHIPPED | OUTPATIENT
Start: 2021-12-27 | End: 2022-01-31

## 2021-12-27 RX ORDER — NAPROXEN 500 MG/1
TABLET ORAL
Qty: 60 TABLET | Refills: 0 | Status: SHIPPED | OUTPATIENT
Start: 2021-12-27 | End: 2022-01-31

## 2022-01-05 ENCOUNTER — OFFICE VISIT (OUTPATIENT)
Dept: FAMILY MEDICINE CLINIC | Facility: CLINIC | Age: 52
End: 2022-01-05

## 2022-01-05 VITALS
HEART RATE: 73 BPM | WEIGHT: 231 LBS | HEIGHT: 64 IN | BODY MASS INDEX: 39.44 KG/M2 | DIASTOLIC BLOOD PRESSURE: 74 MMHG | OXYGEN SATURATION: 97 % | SYSTOLIC BLOOD PRESSURE: 130 MMHG

## 2022-01-05 DIAGNOSIS — R73.03 PREDIABETES: ICD-10-CM

## 2022-01-05 DIAGNOSIS — G56.03 CARPAL TUNNEL SYNDROME, BILATERAL: ICD-10-CM

## 2022-01-05 DIAGNOSIS — I10 ESSENTIAL HYPERTENSION: Primary | ICD-10-CM

## 2022-01-05 PROCEDURE — 99213 OFFICE O/P EST LOW 20 MIN: CPT | Performed by: FAMILY MEDICINE

## 2022-01-05 RX ORDER — MONTELUKAST SODIUM 10 MG/1
10 TABLET ORAL NIGHTLY
Qty: 90 TABLET | Refills: 3 | Status: SHIPPED | OUTPATIENT
Start: 2022-01-05 | End: 2023-01-03

## 2022-01-05 RX ORDER — FLUTICASONE PROPIONATE 50 MCG
2 SPRAY, SUSPENSION (ML) NASAL DAILY
COMMUNITY

## 2022-01-05 NOTE — PROGRESS NOTES
"Chief Complaint  Carpal Tunnel (bilateral, recheck)    Subjective          Josie Waller presents to [facility] - [place]  History of Present Illness    Patient presents today for follow up.  Blood pressure controlled.  Taking losartan 50 mg daily and HCTZ 12.5 mg daily.  Patient continues to have some carpal tunnel pain.  Taking naproxen, which helps with this and back pain.  Patient has seen orthopedic surgery.  Has decided to hold on surgery right now - but may be needed in the future.  Physical therapy for home exercises recommended.      Objective   Vital Signs:   /74   Pulse 73   Ht 162.6 cm (64\")   Wt 105 kg (231 lb)   SpO2 97%   BMI 39.65 kg/m²     Physical Exam  Vitals reviewed.   Constitutional:       General: She is not in acute distress.     Appearance: She is well-developed.   Cardiovascular:      Rate and Rhythm: Normal rate and regular rhythm.      Heart sounds: Normal heart sounds. No murmur heard.      Pulmonary:      Effort: Pulmonary effort is normal. No respiratory distress.      Breath sounds: Normal breath sounds. No wheezing or rales.   Skin:     General: Skin is warm and dry.   Neurological:      Mental Status: She is alert and oriented to person, place, and time.        Result Review :   The following data was reviewed by: Nisha Clark MD on 01/05/2022:  Common labs    Common Labsle 5/25/21   Hemoglobin A1C 5.70 (A)   (A) Abnormal value            Lab Results   Component Value Date    GLUCOSE 110 (H) 02/04/2021    BUN 13 02/04/2021    CREATININE 0.54 (L) 02/04/2021    EGFRIFNONA 120 02/04/2021    BCR 24.1 02/04/2021    K 4.1 02/04/2021    CO2 26.9 02/04/2021    CALCIUM 9.5 02/04/2021    ALBUMIN 4.30 02/04/2021    AST 17 02/04/2021    ALT 18 02/04/2021     Lab Results   Component Value Date    WBC 10.41 02/04/2021    HGB 13.1 02/04/2021    HCT 38.2 02/04/2021    MCV 82.0 02/04/2021     02/04/2021               "   Assessment and Plan    Diagnoses and all orders for this visit:    1. Essential hypertension (Primary)    2. Carpal tunnel syndrome, bilateral    3. Prediabetes    Other orders  -     montelukast (SINGULAIR) 10 MG tablet; Take 1 tablet by mouth Every Night.  Dispense: 90 tablet; Refill: 3      Patient seen today for follow up  Blood pressure well controlled, continue current antihypertensive medications  Continue conservative management of carpal tunnel syndrome  Had ortho consultation and follow up as needed to discuss surgical intervention  Continue monitoring labs for prediabetes, lifestyle modifications with diet and exercise  Will need labs with next visit  Needed refills provided today          Follow Up   Return in about 6 months (around 7/5/2022) for Annual physical, Recheck.  Patient was given instructions and counseling regarding her condition or for health maintenance advice. Please see specific information pulled into the AVS if appropriate.           This document has been electronically signed by Nisha Clark MD

## 2022-01-09 DIAGNOSIS — I10 ESSENTIAL HYPERTENSION: ICD-10-CM

## 2022-01-10 RX ORDER — HYDROCHLOROTHIAZIDE 12.5 MG/1
TABLET ORAL
Qty: 30 TABLET | Refills: 0 | Status: SHIPPED | OUTPATIENT
Start: 2022-01-10 | End: 2022-02-08

## 2022-01-30 DIAGNOSIS — I10 ESSENTIAL HYPERTENSION: ICD-10-CM

## 2022-01-30 DIAGNOSIS — M47.814 SPONDYLOSIS OF THORACIC REGION WITHOUT MYELOPATHY OR RADICULOPATHY: ICD-10-CM

## 2022-01-31 DIAGNOSIS — M47.814 SPONDYLOSIS OF THORACIC REGION WITHOUT MYELOPATHY OR RADICULOPATHY: ICD-10-CM

## 2022-01-31 DIAGNOSIS — I10 ESSENTIAL HYPERTENSION: ICD-10-CM

## 2022-01-31 RX ORDER — NAPROXEN 500 MG/1
500 TABLET ORAL 2 TIMES DAILY WITH MEALS
Qty: 60 TABLET | Refills: 5 | Status: SHIPPED | OUTPATIENT
Start: 2022-01-31 | End: 2022-06-27

## 2022-01-31 RX ORDER — LOSARTAN POTASSIUM 50 MG/1
TABLET ORAL
Qty: 30 TABLET | Refills: 0 | Status: SHIPPED | OUTPATIENT
Start: 2022-01-31 | End: 2022-01-31 | Stop reason: SDUPTHER

## 2022-01-31 RX ORDER — LOSARTAN POTASSIUM 50 MG/1
50 TABLET ORAL DAILY
Qty: 30 TABLET | Refills: 5 | Status: SHIPPED | OUTPATIENT
Start: 2022-01-31 | End: 2022-07-26

## 2022-01-31 RX ORDER — NAPROXEN 500 MG/1
TABLET ORAL
Qty: 60 TABLET | Refills: 0 | Status: SHIPPED | OUTPATIENT
Start: 2022-01-31 | End: 2022-01-31 | Stop reason: SDUPTHER

## 2022-02-08 DIAGNOSIS — I10 ESSENTIAL HYPERTENSION: ICD-10-CM

## 2022-02-08 RX ORDER — HYDROCHLOROTHIAZIDE 12.5 MG/1
TABLET ORAL
Qty: 30 TABLET | Refills: 0 | Status: SHIPPED | OUTPATIENT
Start: 2022-02-08 | End: 2022-03-21

## 2022-03-19 DIAGNOSIS — I10 ESSENTIAL HYPERTENSION: ICD-10-CM

## 2022-03-21 RX ORDER — HYDROCHLOROTHIAZIDE 12.5 MG/1
TABLET ORAL
Qty: 30 TABLET | Refills: 0 | Status: SHIPPED | OUTPATIENT
Start: 2022-03-21 | End: 2022-04-25

## 2022-04-23 DIAGNOSIS — I10 ESSENTIAL HYPERTENSION: ICD-10-CM

## 2022-04-25 RX ORDER — HYDROCHLOROTHIAZIDE 12.5 MG/1
TABLET ORAL
Qty: 30 TABLET | Refills: 0 | Status: SHIPPED | OUTPATIENT
Start: 2022-04-25 | End: 2022-05-16

## 2022-05-16 DIAGNOSIS — I10 ESSENTIAL HYPERTENSION: ICD-10-CM

## 2022-05-16 RX ORDER — HYDROCHLOROTHIAZIDE 12.5 MG/1
TABLET ORAL
Qty: 30 TABLET | Refills: 0 | Status: SHIPPED | OUTPATIENT
Start: 2022-05-16 | End: 2022-05-16

## 2022-05-16 RX ORDER — HYDROCHLOROTHIAZIDE 12.5 MG/1
12.5 TABLET ORAL DAILY
Qty: 30 TABLET | Refills: 5 | Status: SHIPPED | OUTPATIENT
Start: 2022-05-16 | End: 2022-12-07

## 2022-06-26 DIAGNOSIS — M47.814 SPONDYLOSIS OF THORACIC REGION WITHOUT MYELOPATHY OR RADICULOPATHY: ICD-10-CM

## 2022-06-27 RX ORDER — NAPROXEN 500 MG/1
TABLET ORAL
Qty: 60 TABLET | Refills: 0 | Status: SHIPPED | OUTPATIENT
Start: 2022-06-27 | End: 2022-07-26

## 2022-07-05 ENCOUNTER — OFFICE VISIT (OUTPATIENT)
Dept: FAMILY MEDICINE CLINIC | Facility: CLINIC | Age: 52
End: 2022-07-05

## 2022-07-05 ENCOUNTER — LAB (OUTPATIENT)
Dept: LAB | Facility: HOSPITAL | Age: 52
End: 2022-07-05

## 2022-07-05 VITALS
BODY MASS INDEX: 40.29 KG/M2 | HEART RATE: 82 BPM | OXYGEN SATURATION: 98 % | WEIGHT: 236 LBS | SYSTOLIC BLOOD PRESSURE: 132 MMHG | HEIGHT: 64 IN | DIASTOLIC BLOOD PRESSURE: 70 MMHG

## 2022-07-05 DIAGNOSIS — Z12.31 ENCOUNTER FOR SCREENING MAMMOGRAM FOR MALIGNANT NEOPLASM OF BREAST: ICD-10-CM

## 2022-07-05 DIAGNOSIS — R73.03 PREDIABETES: ICD-10-CM

## 2022-07-05 DIAGNOSIS — R25.3 MUSCLE TWITCHING: ICD-10-CM

## 2022-07-05 DIAGNOSIS — Z00.00 ANNUAL PHYSICAL EXAM: Primary | ICD-10-CM

## 2022-07-05 DIAGNOSIS — I10 ESSENTIAL HYPERTENSION: ICD-10-CM

## 2022-07-05 DIAGNOSIS — G56.03 CARPAL TUNNEL SYNDROME, BILATERAL: ICD-10-CM

## 2022-07-05 DIAGNOSIS — Z00.00 ANNUAL PHYSICAL EXAM: ICD-10-CM

## 2022-07-05 PROCEDURE — 82607 VITAMIN B-12: CPT

## 2022-07-05 PROCEDURE — 99396 PREV VISIT EST AGE 40-64: CPT | Performed by: FAMILY MEDICINE

## 2022-07-05 PROCEDURE — 80050 GENERAL HEALTH PANEL: CPT

## 2022-07-05 PROCEDURE — 36415 COLL VENOUS BLD VENIPUNCTURE: CPT

## 2022-07-05 PROCEDURE — 82306 VITAMIN D 25 HYDROXY: CPT

## 2022-07-05 PROCEDURE — 80061 LIPID PANEL: CPT

## 2022-07-05 NOTE — PROGRESS NOTES
Chief Complaint  Hypertension and Annual Exam    Subjective    History of Present Illness {CC  Problem List  Visit  Diagnosis   Encounters  Notes  Medications  Labs  Result Review Imaging  Media :23}     Josie Waller presents to [facility] - [place] for     Chief Complaint   Patient presents with   • Hypertension   • Annual Exam      Patient seen today for annual exam.  Chronic medical problems include hypertension, environmental allergies, vitamin D deficiency and carpal tunnel syndrome.  Hypertension well controlled.  Patient is currently taking losartan 50 mg daily and hydrochlorothiazide 12.5 mg daily.  No problems with this medication.  Has been having some abnormal muscle twitching around her mouth.  Drinking plenty water, no concerns for dehydration.  Patient has new job, so carpal tunnel symptoms have been doing better.  She did follow with orthopedic surgery, and does not wish for surgical options at this time.    Working on healthy diet and regular exercise  Last colonoscopy or FIT test: never  Last DEXA: 11/20/2019  Last PAP: 07/24/2017, normal cytology with negative HPV  Last Mammo: 07/16/2018    Immunization status: missing doses of COVID19 vaccine.    The following portions of the patient's history were reviewed and updated as appropriate:problem list, current medications, allergies, past family history, past medical history, past social history and past surgical history.    Current Outpatient Medications:   •  Etonogestrel (NEXPLANON) 68 MG implant subdermal implant, Inject 1 each into the appropriate area of the skin as directed by provider 1 (One) Time., Disp: , Rfl:   •  fexofenadine (ALLEGRA) 180 MG tablet, Take 180 mg by mouth Daily., Disp: , Rfl:   •  fluticasone (FLONASE) 50 MCG/ACT nasal spray, 2 sprays into the nostril(s) as directed by provider Daily., Disp: , Rfl:   •  halobetasol (ULTRAVATE) 0.05 % cream, Apply  topically to the  "appropriate area as directed Daily As Needed for Irritation., Disp: 50 g, Rfl: 4  •  hydroCHLOROthiazide (HYDRODIURIL) 12.5 MG tablet, Take 1 tablet by mouth Daily., Disp: 30 tablet, Rfl: 5  •  losartan (COZAAR) 50 MG tablet, Take 1 tablet by mouth Daily., Disp: 30 tablet, Rfl: 5  •  montelukast (SINGULAIR) 10 MG tablet, Take 1 tablet by mouth Every Night., Disp: 90 tablet, Rfl: 3  •  multivitamin (THERAGRAN) tablet tablet, Take 1 tablet by mouth Daily., Disp: , Rfl:   •  naproxen (NAPROSYN) 500 MG tablet, TAKE 1 TABLET BY MOUTH TWICE DAILY WITH MEALS, Disp: 60 tablet, Rfl: 0     Objective       Vital Signs:   /70   Pulse 82   Ht 162.6 cm (64\")   Wt 107 kg (236 lb)   SpO2 98%   BMI 40.51 kg/m²     Physical Exam  Vitals reviewed.   Constitutional:       General: She is not in acute distress.     Appearance: She is well-developed.   HENT:      Right Ear: Tympanic membrane and ear canal normal.      Left Ear: Tympanic membrane and ear canal normal.   Eyes:      Extraocular Movements: Extraocular movements intact.      Conjunctiva/sclera: Conjunctivae normal.      Pupils: Pupils are equal, round, and reactive to light.   Cardiovascular:      Rate and Rhythm: Normal rate and regular rhythm.      Heart sounds: Normal heart sounds. No murmur heard.  Pulmonary:      Effort: Pulmonary effort is normal. No respiratory distress.      Breath sounds: Normal breath sounds. No wheezing or rales.   Musculoskeletal:      Cervical back: Neck supple.   Skin:     General: Skin is warm and dry.      Findings: No rash.   Neurological:      Mental Status: She is alert and oriented to person, place, and time.      Comments: Cranial nerves III through XII tested and intact        Result Review :{ Labs  Result Review  Imaging  Med Tab  Media :23}   The following data was reviewed by: Nisha Clark MD on 07/05/2022    Hemoglobin A1c, TSH and vitamin B12 from 5/25/2021 reviewed.  Thyroid studies, hep C antibody, CMP, lipid " panel, hemoglobin A1c and CBC from 2/4/2021.           Assessment and Plan {CC Problem List  Visit Diagnosis  ROS  Review (Popup)  Health Maintenance  Quality  BestPractice  Medications  SmartSets  SnapShot Encounters  Media :23}   Diagnoses and all orders for this visit:    1. Annual physical exam (Primary)  -     Lipid Panel; Future  -     CBC & Differential; Future  -     Comprehensive Metabolic Panel; Future  -     Vitamin B12; Future  -     Vitamin D 25 Hydroxy; Future  -     TSH; Future    2. Essential hypertension    3. Carpal tunnel syndrome, bilateral    4. Prediabetes    5. Muscle twitching  -     Vitamin B12; Future  -     Vitamin D 25 Hydroxy; Future  -     TSH; Future    6. Encounter for screening mammogram for malignant neoplasm of breast  -     Mammo Screening Digital Tomosynthesis Bilateral With CAD; Future       Annual exam completed today  Check labs as above  Hypertension controlled, continue current medication  Continue wrist splints for carpal tunnel, patient not ready to have surgery at this time  Patient has had consultation with orthopedic surgery, and should be able to contact their office when/if she is ready to schedule  Continue healthy diet and increased physical activity for prediabetes  Check vitamin levels and thyroid for muscle twitching  Patient has adequate hydration level  Will monitor and call with persistent or worsening symptoms  Due for mammogram, ordered today      Follow Up {Instructions Charge Capture  Follow-up Communications :23}   Return in about 6 months (around 1/5/2023) for Annual physical.  Patient was given instructions and counseling regarding her condition or for health maintenance advice. Please see specific information pulled into the AVS if appropriate.            This document has been electronically signed by Nisha Clark MD

## 2022-07-06 LAB
25(OH)D3 SERPL-MCNC: 47.4 NG/ML (ref 30–100)
ALBUMIN SERPL-MCNC: 4.4 G/DL (ref 3.5–5.2)
ALBUMIN/GLOB SERPL: 1.5 G/DL
ALP SERPL-CCNC: 67 U/L (ref 39–117)
ALT SERPL W P-5'-P-CCNC: 19 U/L (ref 1–33)
ANION GAP SERPL CALCULATED.3IONS-SCNC: 9.1 MMOL/L (ref 5–15)
AST SERPL-CCNC: 16 U/L (ref 1–32)
BASOPHILS # BLD AUTO: 0.09 10*3/MM3 (ref 0–0.2)
BASOPHILS NFR BLD AUTO: 0.6 % (ref 0–1.5)
BILIRUB SERPL-MCNC: 0.2 MG/DL (ref 0–1.2)
BUN SERPL-MCNC: 18 MG/DL (ref 6–20)
BUN/CREAT SERPL: 31 (ref 7–25)
CALCIUM SPEC-SCNC: 9.3 MG/DL (ref 8.6–10.5)
CHLORIDE SERPL-SCNC: 102 MMOL/L (ref 98–107)
CHOLEST SERPL-MCNC: 163 MG/DL (ref 0–200)
CO2 SERPL-SCNC: 25.9 MMOL/L (ref 22–29)
CREAT SERPL-MCNC: 0.58 MG/DL (ref 0.57–1)
DEPRECATED RDW RBC AUTO: 41.3 FL (ref 37–54)
EGFRCR SERPLBLD CKD-EPI 2021: 111.1 ML/MIN/1.73
EOSINOPHIL # BLD AUTO: 0.24 10*3/MM3 (ref 0–0.4)
EOSINOPHIL NFR BLD AUTO: 1.7 % (ref 0.3–6.2)
ERYTHROCYTE [DISTWIDTH] IN BLOOD BY AUTOMATED COUNT: 13.4 % (ref 12.3–15.4)
GLOBULIN UR ELPH-MCNC: 3 GM/DL
GLUCOSE SERPL-MCNC: 83 MG/DL (ref 65–99)
HCT VFR BLD AUTO: 38.4 % (ref 34–46.6)
HDLC SERPL-MCNC: 59 MG/DL (ref 40–60)
HGB BLD-MCNC: 12.9 G/DL (ref 12–15.9)
IMM GRANULOCYTES # BLD AUTO: 0.04 10*3/MM3 (ref 0–0.05)
IMM GRANULOCYTES NFR BLD AUTO: 0.3 % (ref 0–0.5)
LDLC SERPL CALC-MCNC: 93 MG/DL (ref 0–100)
LDLC/HDLC SERPL: 1.58 {RATIO}
LYMPHOCYTES # BLD AUTO: 4.06 10*3/MM3 (ref 0.7–3.1)
LYMPHOCYTES NFR BLD AUTO: 29 % (ref 19.6–45.3)
MCH RBC QN AUTO: 29.1 PG (ref 26.6–33)
MCHC RBC AUTO-ENTMCNC: 33.6 G/DL (ref 31.5–35.7)
MCV RBC AUTO: 86.7 FL (ref 79–97)
MONOCYTES # BLD AUTO: 1 10*3/MM3 (ref 0.1–0.9)
MONOCYTES NFR BLD AUTO: 7.1 % (ref 5–12)
NEUTROPHILS NFR BLD AUTO: 61.3 % (ref 42.7–76)
NEUTROPHILS NFR BLD AUTO: 8.58 10*3/MM3 (ref 1.7–7)
NRBC BLD AUTO-RTO: 0 /100 WBC (ref 0–0.2)
PLATELET # BLD AUTO: 361 10*3/MM3 (ref 140–450)
PMV BLD AUTO: 9.9 FL (ref 6–12)
POTASSIUM SERPL-SCNC: 4.1 MMOL/L (ref 3.5–5.2)
PROT SERPL-MCNC: 7.4 G/DL (ref 6–8.5)
RBC # BLD AUTO: 4.43 10*6/MM3 (ref 3.77–5.28)
SODIUM SERPL-SCNC: 137 MMOL/L (ref 136–145)
TRIGL SERPL-MCNC: 54 MG/DL (ref 0–150)
TSH SERPL DL<=0.05 MIU/L-ACNC: 3.4 UIU/ML (ref 0.27–4.2)
VIT B12 BLD-MCNC: 495 PG/ML (ref 211–946)
VLDLC SERPL-MCNC: 11 MG/DL (ref 5–40)
WBC NRBC COR # BLD: 14.01 10*3/MM3 (ref 3.4–10.8)

## 2022-07-07 DIAGNOSIS — D72.829 LEUKOCYTOSIS, UNSPECIFIED TYPE: Primary | ICD-10-CM

## 2022-07-26 DIAGNOSIS — I10 ESSENTIAL HYPERTENSION: ICD-10-CM

## 2022-07-26 DIAGNOSIS — M47.814 SPONDYLOSIS OF THORACIC REGION WITHOUT MYELOPATHY OR RADICULOPATHY: ICD-10-CM

## 2022-07-26 RX ORDER — LOSARTAN POTASSIUM 50 MG/1
TABLET ORAL
Qty: 30 TABLET | Refills: 3 | Status: SHIPPED | OUTPATIENT
Start: 2022-07-26 | End: 2022-08-08

## 2022-07-26 RX ORDER — NAPROXEN 500 MG/1
TABLET ORAL
Qty: 60 TABLET | Refills: 3 | Status: SHIPPED | OUTPATIENT
Start: 2022-07-26 | End: 2022-11-01

## 2022-08-02 ENCOUNTER — TELEPHONE (OUTPATIENT)
Dept: FAMILY MEDICINE CLINIC | Facility: CLINIC | Age: 52
End: 2022-08-02

## 2022-08-02 NOTE — TELEPHONE ENCOUNTER
Per Dr. Clark, Ms. Waller has been called with recent Screening Mammogram results & recommendations.  Continue current medications and follow-up as planned or sooner if any problems.       ----- Message from Nisha Clark MD sent at 8/2/2022  4:46 PM CDT -----  Please call and let patient know that mammogram is normal.  Plan to repeat in 1 year.  Thanks, JING Clark

## 2022-08-08 DIAGNOSIS — I10 ESSENTIAL HYPERTENSION: ICD-10-CM

## 2022-08-08 RX ORDER — LOSARTAN POTASSIUM 50 MG/1
TABLET ORAL
Qty: 30 TABLET | Refills: 0 | Status: SHIPPED | OUTPATIENT
Start: 2022-08-08 | End: 2022-08-08 | Stop reason: SDUPTHER

## 2022-08-08 RX ORDER — LOSARTAN POTASSIUM 50 MG/1
50 TABLET ORAL DAILY
Qty: 30 TABLET | Refills: 5 | Status: SHIPPED | OUTPATIENT
Start: 2022-08-08

## 2022-11-01 DIAGNOSIS — M47.814 SPONDYLOSIS OF THORACIC REGION WITHOUT MYELOPATHY OR RADICULOPATHY: ICD-10-CM

## 2022-11-01 RX ORDER — NAPROXEN 500 MG/1
500 TABLET ORAL 2 TIMES DAILY WITH MEALS
Qty: 60 TABLET | Refills: 5 | Status: SHIPPED | OUTPATIENT
Start: 2022-11-01

## 2022-11-01 RX ORDER — NAPROXEN 500 MG/1
TABLET ORAL
Qty: 60 TABLET | Refills: 0 | Status: SHIPPED | OUTPATIENT
Start: 2022-11-01 | End: 2022-11-01

## 2022-12-07 DIAGNOSIS — I10 ESSENTIAL HYPERTENSION: ICD-10-CM

## 2022-12-07 RX ORDER — HYDROCHLOROTHIAZIDE 12.5 MG/1
TABLET ORAL
Qty: 30 TABLET | Refills: 0 | Status: SHIPPED | OUTPATIENT
Start: 2022-12-07 | End: 2022-12-07 | Stop reason: SDUPTHER

## 2022-12-07 RX ORDER — HYDROCHLOROTHIAZIDE 12.5 MG/1
12.5 TABLET ORAL DAILY
Qty: 30 TABLET | Refills: 5 | Status: SHIPPED | OUTPATIENT
Start: 2022-12-07

## 2022-12-20 ENCOUNTER — OFFICE VISIT (OUTPATIENT)
Dept: FAMILY MEDICINE CLINIC | Facility: CLINIC | Age: 52
End: 2022-12-20
Payer: COMMERCIAL

## 2022-12-20 ENCOUNTER — LAB (OUTPATIENT)
Dept: LAB | Facility: HOSPITAL | Age: 52
End: 2022-12-20

## 2022-12-20 VITALS
HEIGHT: 64 IN | SYSTOLIC BLOOD PRESSURE: 125 MMHG | WEIGHT: 237 LBS | DIASTOLIC BLOOD PRESSURE: 70 MMHG | OXYGEN SATURATION: 96 % | HEART RATE: 81 BPM | BODY MASS INDEX: 40.46 KG/M2

## 2022-12-20 DIAGNOSIS — I10 ESSENTIAL HYPERTENSION: Primary | ICD-10-CM

## 2022-12-20 DIAGNOSIS — M47.814 SPONDYLOSIS OF THORACIC REGION WITHOUT MYELOPATHY OR RADICULOPATHY: ICD-10-CM

## 2022-12-20 DIAGNOSIS — E66.01 CLASS 3 SEVERE OBESITY WITH SERIOUS COMORBIDITY AND BODY MASS INDEX (BMI) OF 40.0 TO 44.9 IN ADULT, UNSPECIFIED OBESITY TYPE: ICD-10-CM

## 2022-12-20 DIAGNOSIS — R73.03 PREDIABETES: ICD-10-CM

## 2022-12-20 DIAGNOSIS — I10 ESSENTIAL HYPERTENSION: ICD-10-CM

## 2022-12-20 DIAGNOSIS — D72.829 LEUKOCYTOSIS, UNSPECIFIED TYPE: ICD-10-CM

## 2022-12-20 DIAGNOSIS — G56.03 CARPAL TUNNEL SYNDROME, BILATERAL: ICD-10-CM

## 2022-12-20 PROCEDURE — 85025 COMPLETE CBC W/AUTO DIFF WBC: CPT

## 2022-12-20 PROCEDURE — 83036 HEMOGLOBIN GLYCOSYLATED A1C: CPT

## 2022-12-20 PROCEDURE — 99214 OFFICE O/P EST MOD 30 MIN: CPT | Performed by: FAMILY MEDICINE

## 2022-12-20 PROCEDURE — 36415 COLL VENOUS BLD VENIPUNCTURE: CPT

## 2022-12-20 PROCEDURE — 80053 COMPREHEN METABOLIC PANEL: CPT

## 2022-12-20 NOTE — PROGRESS NOTES
Chief Complaint  Hypertension    Subjective    History of Present Illness {CC  Problem List  Visit  Diagnosis   Encounters  Notes  Medications  Labs  Result Review Imaging  Media :23}     Josie Waller presents to [facility] - [place] for     Chief Complaint   Patient presents with   • Hypertension      Patient seen today for follow up.  Hypertension has been controlled, adherent to medication regimen.  Does have some concern about obesity.  Has tried multiple treatments for weight loss, including OTC supplements, calorie counting, weight watchers and recently (keto and low carb).  She has not been exercising as much as she should, uses elliptical and light walking when she does exercise.      Wt Readings from Last 3 Encounters:   12/20/22 108 kg (237 lb)   07/05/22 107 kg (236 lb)   01/05/22 105 kg (231 lb)          Current Outpatient Medications:   •  Etonogestrel (NEXPLANON) 68 MG implant subdermal implant, Inject 1 each into the appropriate area of the skin as directed by provider 1 (One) Time., Disp: , Rfl:   •  fexofenadine (ALLEGRA) 180 MG tablet, Take 180 mg by mouth Daily., Disp: , Rfl:   •  fluticasone (FLONASE) 50 MCG/ACT nasal spray, 2 sprays into the nostril(s) as directed by provider Daily., Disp: , Rfl:   •  halobetasol (ULTRAVATE) 0.05 % cream, Apply  topically to the appropriate area as directed Daily As Needed for Irritation., Disp: 50 g, Rfl: 4  •  hydroCHLOROthiazide (HYDRODIURIL) 12.5 MG tablet, Take 1 tablet by mouth Daily., Disp: 30 tablet, Rfl: 5  •  losartan (COZAAR) 50 MG tablet, Take 1 tablet by mouth Daily., Disp: 30 tablet, Rfl: 5  •  montelukast (SINGULAIR) 10 MG tablet, Take 1 tablet by mouth Every Night., Disp: 90 tablet, Rfl: 3  •  multivitamin (THERAGRAN) tablet tablet, Take 1 tablet by mouth Daily., Disp: , Rfl:   •  naproxen (NAPROSYN) 500 MG tablet, Take 1 tablet by mouth 2 (Two) Times a Day With Meals., Disp: 60 tablet,  Rfl: 5     Objective       Vital Signs:   /70   Pulse 81   Ht 162.6 cm (64\")   Wt 108 kg (237 lb)   SpO2 96%   BMI 40.68 kg/m²     Physical Exam  Vitals reviewed.   Constitutional:       General: She is not in acute distress.     Appearance: She is well-developed.   Cardiovascular:      Rate and Rhythm: Normal rate and regular rhythm.      Heart sounds: Normal heart sounds. No murmur heard.  Pulmonary:      Effort: Pulmonary effort is normal. No respiratory distress.      Breath sounds: Normal breath sounds. No wheezing or rales.   Skin:     General: Skin is warm and dry.   Neurological:      Mental Status: She is alert and oriented to person, place, and time.        Result Review :{ Labs  Result Review  Imaging  Med Tab  Media :23}   The following data was reviewed by: Nisha Clark MD on 12/20/2022    Common labs    Common Labs 7/5/22 7/5/22 7/5/22    1654 1654 1654   Glucose   83   BUN   18   Creatinine   0.58   Sodium   137   Potassium   4.1   Chloride   102   Calcium   9.3   Albumin   4.40   Total Bilirubin   0.2   Alkaline Phosphatase   67   AST (SGOT)   16   ALT (SGPT)   19   WBC 14.01 (A)     Hemoglobin 12.9     Hematocrit 38.4     Platelets 361     Total Cholesterol  163    Triglycerides  54    HDL Cholesterol  59    LDL Cholesterol   93    (A) Abnormal value                      Assessment and Plan {CC Problem List  Visit Diagnosis  ROS  Review (Popup)  Moverati Maintenance  Quality  BestPractice  Medications  SmartSets  SnapShot Encounters  Media :23}   Diagnoses and all orders for this visit:    1. Essential hypertension (Primary)  -     CBC & Differential; Future  -     Comprehensive Metabolic Panel; Future    2. Spondylosis of thoracic region without myelopathy or radiculopathy    3. Carpal tunnel syndrome, bilateral    4. Class 3 severe obesity with serious comorbidity and body mass index (BMI) of 40.0 to 44.9 in adult, unspecified obesity type (HCC)  -      Semaglutide-Weight Management 0.25 MG/0.5ML solution auto-injector; Inject 0.25 mg under the skin into the appropriate area as directed 1 (One) Time Per Week.  Dispense: 2 mL; Refill: 2      Hypertension controlled, continue current medications  Check labs as above  Continue conservative treatments for carpal tunnel as needed  Can contact orthopedic surgeon when she is ready to schedule  Body mass index is 40.68 kg/m².  Class 3 Severe Obesity (BMI >=40). Obesity-related health conditions include the following: hypertension. Obesity is unchanged. BMI is is above average; BMI management plan is completed. We discussed low calorie, low carb based diet program, portion control, increasing exercise and pharmacologic options including Saxenda/Wegovy.  Risks and benefits of Wegovy discussed  Medication should be combined with diet/exercise for best results  Send weight updates once a month if she is able to start medication      Follow Up {Instructions Charge Capture  Follow-up Communications :23}   Return in about 7 months (around 7/10/2023) for Annual physical.  Patient was given instructions and counseling regarding her condition or for health maintenance advice. Please see specific information pulled into the AVS if appropriate.          This document has been electronically signed by Nisha Clark MD

## 2022-12-21 DIAGNOSIS — R73.03 PREDIABETES: Primary | ICD-10-CM

## 2022-12-21 LAB
ALBUMIN SERPL-MCNC: 4.6 G/DL (ref 3.5–5.2)
ALBUMIN/GLOB SERPL: 1.5 G/DL
ALP SERPL-CCNC: 76 U/L (ref 39–117)
ALT SERPL W P-5'-P-CCNC: 18 U/L (ref 1–33)
ANION GAP SERPL CALCULATED.3IONS-SCNC: 8.4 MMOL/L (ref 5–15)
AST SERPL-CCNC: 18 U/L (ref 1–32)
BASOPHILS # BLD AUTO: 0.07 10*3/MM3 (ref 0–0.2)
BASOPHILS NFR BLD AUTO: 0.6 % (ref 0–1.5)
BILIRUB SERPL-MCNC: <0.2 MG/DL (ref 0–1.2)
BUN SERPL-MCNC: 13 MG/DL (ref 6–20)
BUN/CREAT SERPL: 18.1 (ref 7–25)
CALCIUM SPEC-SCNC: 9.5 MG/DL (ref 8.6–10.5)
CHLORIDE SERPL-SCNC: 101 MMOL/L (ref 98–107)
CO2 SERPL-SCNC: 30.6 MMOL/L (ref 22–29)
CREAT SERPL-MCNC: 0.72 MG/DL (ref 0.57–1)
DEPRECATED RDW RBC AUTO: 39.4 FL (ref 37–54)
EGFRCR SERPLBLD CKD-EPI 2021: 102 ML/MIN/1.73
EOSINOPHIL # BLD AUTO: 0.21 10*3/MM3 (ref 0–0.4)
EOSINOPHIL NFR BLD AUTO: 1.8 % (ref 0.3–6.2)
ERYTHROCYTE [DISTWIDTH] IN BLOOD BY AUTOMATED COUNT: 12.8 % (ref 12.3–15.4)
GLOBULIN UR ELPH-MCNC: 3 GM/DL
GLUCOSE SERPL-MCNC: 128 MG/DL (ref 65–99)
HBA1C MFR BLD: 5.8 % (ref 4.8–5.6)
HCT VFR BLD AUTO: 36.8 % (ref 34–46.6)
HGB BLD-MCNC: 12.8 G/DL (ref 12–15.9)
IMM GRANULOCYTES # BLD AUTO: 0.05 10*3/MM3 (ref 0–0.05)
IMM GRANULOCYTES NFR BLD AUTO: 0.4 % (ref 0–0.5)
LYMPHOCYTES # BLD AUTO: 3.78 10*3/MM3 (ref 0.7–3.1)
LYMPHOCYTES NFR BLD AUTO: 32.6 % (ref 19.6–45.3)
MCH RBC QN AUTO: 29.2 PG (ref 26.6–33)
MCHC RBC AUTO-ENTMCNC: 34.8 G/DL (ref 31.5–35.7)
MCV RBC AUTO: 83.8 FL (ref 79–97)
MONOCYTES # BLD AUTO: 0.92 10*3/MM3 (ref 0.1–0.9)
MONOCYTES NFR BLD AUTO: 7.9 % (ref 5–12)
NEUTROPHILS NFR BLD AUTO: 56.7 % (ref 42.7–76)
NEUTROPHILS NFR BLD AUTO: 6.58 10*3/MM3 (ref 1.7–7)
NRBC BLD AUTO-RTO: 0 /100 WBC (ref 0–0.2)
PLATELET # BLD AUTO: 431 10*3/MM3 (ref 140–450)
PMV BLD AUTO: 9.6 FL (ref 6–12)
POTASSIUM SERPL-SCNC: 3.8 MMOL/L (ref 3.5–5.2)
PROT SERPL-MCNC: 7.6 G/DL (ref 6–8.5)
RBC # BLD AUTO: 4.39 10*6/MM3 (ref 3.77–5.28)
SODIUM SERPL-SCNC: 140 MMOL/L (ref 136–145)
WBC NRBC COR # BLD: 11.61 10*3/MM3 (ref 3.4–10.8)

## 2022-12-22 ENCOUNTER — TELEPHONE (OUTPATIENT)
Dept: FAMILY MEDICINE CLINIC | Facility: CLINIC | Age: 52
End: 2022-12-22

## 2022-12-22 NOTE — PROGRESS NOTES
Per Dr. Clark, Ms. Waller has been called with recent lab results & recommendations.  Continue current medications and follow-up as planned or sooner if any problems.

## 2022-12-22 NOTE — TELEPHONE ENCOUNTER
Per Dr. Clark, Ms. Waller has been called with recent lab results & recommendations.  Continue current medications and follow-up as planned or sooner if any problems.       ----- Message from Nisha Clark MD sent at 12/22/2022  7:53 AM CST -----  HgbA1c stable at 5.8%, in prediabetes range still. Lab work otherwise ok.  Thanks, JING Clark

## 2023-01-03 ENCOUNTER — TELEPHONE (OUTPATIENT)
Dept: FAMILY MEDICINE CLINIC | Facility: CLINIC | Age: 53
End: 2023-01-03
Payer: COMMERCIAL

## 2023-01-03 RX ORDER — MONTELUKAST SODIUM 10 MG/1
TABLET ORAL
Qty: 30 TABLET | Refills: 0 | Status: SHIPPED | OUTPATIENT
Start: 2023-01-03 | End: 2023-02-06

## 2023-01-03 NOTE — TELEPHONE ENCOUNTER
Ask that she check with the pharmacy to see about when they expect it might be off backorder, issues supposed to be better starting 12/2022.  She can try coupon card/code online if eligible (search for Wegovy and look at Savings options).      The other medication approved for weight loss is Saxenda, which is a daily injection.  Depending on her insurance preferences, may also have some trouble with cost.  Same advice for checking their website for savings cards.     Ozempic is the same medication as Wegovy, however it can only be prescribed for persons with diabetes.     Let me know what she thinks about the Saxenda.     Cesar Clark

## 2023-01-03 NOTE — TELEPHONE ENCOUNTER
Ms. Waller said the Wegovy is on back order and with her Insurance it is going to cost her $1400.00  She was asking about the other medication, Ozempic?   I did tell her the Ozempic would probably be about the same.     She uses [facility]  [address]      Please Advise

## 2023-01-03 NOTE — TELEPHONE ENCOUNTER
Per Dr. Clark Information has been relayed to the patient.   She will check and see what she can come up with.     She will call back if she wants to try the Saxenda

## 2023-02-06 RX ORDER — MONTELUKAST SODIUM 10 MG/1
TABLET ORAL
Qty: 30 TABLET | Refills: 0 | Status: SHIPPED | OUTPATIENT
Start: 2023-02-06 | End: 2023-02-06 | Stop reason: SDUPTHER

## 2023-02-06 RX ORDER — MONTELUKAST SODIUM 10 MG/1
10 TABLET ORAL NIGHTLY
Qty: 90 TABLET | Refills: 3 | Status: SHIPPED | OUTPATIENT
Start: 2023-02-06

## 2023-05-18 ENCOUNTER — PATIENT MESSAGE (OUTPATIENT)
Dept: FAMILY MEDICINE CLINIC | Facility: CLINIC | Age: 53
End: 2023-05-18
Payer: COMMERCIAL

## 2023-05-18 DIAGNOSIS — I10 ESSENTIAL HYPERTENSION: Primary | ICD-10-CM

## 2023-05-19 RX ORDER — LOSARTAN POTASSIUM 100 MG/1
100 TABLET ORAL DAILY
Qty: 90 TABLET | Refills: 3 | Status: SHIPPED | OUTPATIENT
Start: 2023-05-19

## 2023-05-19 NOTE — TELEPHONE ENCOUNTER
We can try increasing her losartan to 100 mg daily instead of 50 mg.  Take for 1-2 weeks while monitoring BP and let me know.  There are other agents that can be used if needed.  Thanks, JING Clark

## 2023-07-26 DIAGNOSIS — L24.89 IRRITANT CONTACT DERMATITIS DUE TO OTHER AGENTS: ICD-10-CM

## 2023-08-02 ENCOUNTER — OFFICE VISIT (OUTPATIENT)
Dept: FAMILY MEDICINE CLINIC | Facility: CLINIC | Age: 53
End: 2023-08-02
Payer: COMMERCIAL

## 2023-08-02 VITALS
HEIGHT: 64 IN | WEIGHT: 232 LBS | HEART RATE: 95 BPM | SYSTOLIC BLOOD PRESSURE: 140 MMHG | OXYGEN SATURATION: 96 % | BODY MASS INDEX: 39.61 KG/M2 | DIASTOLIC BLOOD PRESSURE: 74 MMHG

## 2023-08-02 DIAGNOSIS — Z12.31 ENCOUNTER FOR SCREENING MAMMOGRAM FOR MALIGNANT NEOPLASM OF BREAST: ICD-10-CM

## 2023-08-02 DIAGNOSIS — Z12.4 PAP SMEAR FOR CERVICAL CANCER SCREENING: ICD-10-CM

## 2023-08-02 DIAGNOSIS — Z00.00 ANNUAL PHYSICAL EXAM: Primary | ICD-10-CM

## 2023-08-02 DIAGNOSIS — I10 ESSENTIAL HYPERTENSION: ICD-10-CM

## 2023-08-02 DIAGNOSIS — E66.01 CLASS 3 SEVERE OBESITY WITH SERIOUS COMORBIDITY AND BODY MASS INDEX (BMI) OF 40.0 TO 44.9 IN ADULT, UNSPECIFIED OBESITY TYPE: ICD-10-CM

## 2023-08-02 DIAGNOSIS — R73.03 PREDIABETES: ICD-10-CM

## 2023-08-02 PROCEDURE — 99396 PREV VISIT EST AGE 40-64: CPT | Performed by: FAMILY MEDICINE

## 2023-08-02 RX ORDER — METHYLPREDNISOLONE 4 MG/1
TABLET ORAL 2 TIMES DAILY
COMMUNITY

## 2023-08-02 RX ORDER — PEN NEEDLE, DIABETIC 31 G X1/4"
NEEDLE, DISPOSABLE MISCELLANEOUS
Qty: 30 EACH | Refills: 5 | Status: SHIPPED | OUTPATIENT
Start: 2023-08-02

## 2023-08-02 RX ORDER — CEPHALEXIN 500 MG/1
500 CAPSULE ORAL 2 TIMES DAILY
COMMUNITY

## 2023-08-05 ENCOUNTER — PATIENT MESSAGE (OUTPATIENT)
Dept: FAMILY MEDICINE CLINIC | Facility: CLINIC | Age: 53
End: 2023-08-05
Payer: COMMERCIAL

## 2023-08-05 DIAGNOSIS — Z12.11 SCREENING FOR MALIGNANT NEOPLASM OF COLON: Primary | ICD-10-CM

## 2023-08-05 DIAGNOSIS — L24.89 IRRITANT CONTACT DERMATITIS DUE TO OTHER AGENTS: ICD-10-CM

## 2023-08-07 LAB — REF LAB TEST METHOD: NORMAL

## 2023-08-09 ENCOUNTER — TELEPHONE (OUTPATIENT)
Dept: FAMILY MEDICINE CLINIC | Facility: CLINIC | Age: 53
End: 2023-08-09
Payer: COMMERCIAL

## 2023-08-09 NOTE — TELEPHONE ENCOUNTER
Per Dr. Clark, Ms. Waller has been called with recent Pap Smear & HPV results & recommendations.  Continue current medications and follow-up as planned or sooner if any problems.       ----- Message from Nisha Clark MD sent at 8/9/2023 10:31 AM CDT -----  Normal pap with negative HPV, repeat pap smear recommended in 5 years.  Thanks, JING Clark

## 2023-08-13 DIAGNOSIS — I10 ESSENTIAL HYPERTENSION: ICD-10-CM

## 2023-08-14 RX ORDER — HYDROCHLOROTHIAZIDE 12.5 MG/1
TABLET ORAL
Qty: 30 TABLET | Refills: 0 | Status: SHIPPED | OUTPATIENT
Start: 2023-08-14

## 2023-08-15 ENCOUNTER — TELEPHONE (OUTPATIENT)
Dept: FAMILY MEDICINE CLINIC | Facility: CLINIC | Age: 53
End: 2023-08-15
Payer: COMMERCIAL

## 2023-08-15 DIAGNOSIS — M47.814 SPONDYLOSIS OF THORACIC REGION WITHOUT MYELOPATHY OR RADICULOPATHY: ICD-10-CM

## 2023-08-15 RX ORDER — NAPROXEN 500 MG/1
TABLET ORAL
Qty: 60 TABLET | Refills: 0 | Status: SHIPPED | OUTPATIENT
Start: 2023-08-15

## 2023-08-15 NOTE — TELEPHONE ENCOUNTER
Ms. Waller called today wanting to know refills on her Losartan had not been sent in when [facility] told her they requested the refill.     I called [facility] and they have the script for Losartan 100 mg, they said she is requesting Losartan 50 mg.     I called Ms. Waller back and she said she could not take the Losartan 100 mg and you all talked about it and you told her she could go back to the Losartan 50 mg.     That did not get changed in her Med List.     Is it OK to send a new Script for Losartan 50 mg and DC the Losartan 100 mg?     Looks like there is a refill pending in your que for Losartan 50 mg from 08/06/2023    Please Advise    AKIN Dguan

## 2023-08-16 ENCOUNTER — LAB (OUTPATIENT)
Dept: LAB | Facility: HOSPITAL | Age: 53
End: 2023-08-16
Payer: COMMERCIAL

## 2023-08-16 DIAGNOSIS — I10 ESSENTIAL HYPERTENSION: ICD-10-CM

## 2023-08-16 DIAGNOSIS — Z00.00 ANNUAL PHYSICAL EXAM: ICD-10-CM

## 2023-08-16 DIAGNOSIS — R73.03 PREDIABETES: ICD-10-CM

## 2023-08-16 LAB
25(OH)D3 SERPL-MCNC: 37.9 NG/ML (ref 30–100)
ALBUMIN SERPL-MCNC: 4.2 G/DL (ref 3.5–5.2)
ALBUMIN/GLOB SERPL: 1.6 G/DL
ALP SERPL-CCNC: 59 U/L (ref 39–117)
ALT SERPL W P-5'-P-CCNC: 19 U/L (ref 1–33)
ANION GAP SERPL CALCULATED.3IONS-SCNC: 11.3 MMOL/L (ref 5–15)
AST SERPL-CCNC: 17 U/L (ref 1–32)
BASOPHILS # BLD AUTO: 0.07 10*3/MM3 (ref 0–0.2)
BASOPHILS NFR BLD AUTO: 0.8 % (ref 0–1.5)
BILIRUB SERPL-MCNC: 0.3 MG/DL (ref 0–1.2)
BUN SERPL-MCNC: 16 MG/DL (ref 6–20)
BUN/CREAT SERPL: 22.9 (ref 7–25)
CALCIUM SPEC-SCNC: 9.1 MG/DL (ref 8.6–10.5)
CHLORIDE SERPL-SCNC: 107 MMOL/L (ref 98–107)
CHOLEST SERPL-MCNC: 171 MG/DL (ref 0–200)
CO2 SERPL-SCNC: 23.7 MMOL/L (ref 22–29)
CREAT SERPL-MCNC: 0.7 MG/DL (ref 0.57–1)
DEPRECATED RDW RBC AUTO: 42.4 FL (ref 37–54)
EGFRCR SERPLBLD CKD-EPI 2021: 105.5 ML/MIN/1.73
EOSINOPHIL # BLD AUTO: 0.2 10*3/MM3 (ref 0–0.4)
EOSINOPHIL NFR BLD AUTO: 2.2 % (ref 0.3–6.2)
ERYTHROCYTE [DISTWIDTH] IN BLOOD BY AUTOMATED COUNT: 13.5 % (ref 12.3–15.4)
GLOBULIN UR ELPH-MCNC: 2.7 GM/DL
GLUCOSE SERPL-MCNC: 100 MG/DL (ref 65–99)
HBA1C MFR BLD: 5.9 % (ref 4.8–5.6)
HCT VFR BLD AUTO: 38.6 % (ref 34–46.6)
HDLC SERPL-MCNC: 46 MG/DL (ref 40–60)
HGB BLD-MCNC: 13.2 G/DL (ref 12–15.9)
IMM GRANULOCYTES # BLD AUTO: 0.04 10*3/MM3 (ref 0–0.05)
IMM GRANULOCYTES NFR BLD AUTO: 0.4 % (ref 0–0.5)
LDLC SERPL CALC-MCNC: 114 MG/DL (ref 0–100)
LDLC/HDLC SERPL: 2.49 {RATIO}
LYMPHOCYTES # BLD AUTO: 2.82 10*3/MM3 (ref 0.7–3.1)
LYMPHOCYTES NFR BLD AUTO: 31.5 % (ref 19.6–45.3)
MCH RBC QN AUTO: 29.6 PG (ref 26.6–33)
MCHC RBC AUTO-ENTMCNC: 34.2 G/DL (ref 31.5–35.7)
MCV RBC AUTO: 86.5 FL (ref 79–97)
MONOCYTES # BLD AUTO: 0.59 10*3/MM3 (ref 0.1–0.9)
MONOCYTES NFR BLD AUTO: 6.6 % (ref 5–12)
NEUTROPHILS NFR BLD AUTO: 5.22 10*3/MM3 (ref 1.7–7)
NEUTROPHILS NFR BLD AUTO: 58.5 % (ref 42.7–76)
NRBC BLD AUTO-RTO: 0 /100 WBC (ref 0–0.2)
PLATELET # BLD AUTO: 327 10*3/MM3 (ref 140–450)
PMV BLD AUTO: 9.6 FL (ref 6–12)
POTASSIUM SERPL-SCNC: 4 MMOL/L (ref 3.5–5.2)
PROT SERPL-MCNC: 6.9 G/DL (ref 6–8.5)
RBC # BLD AUTO: 4.46 10*6/MM3 (ref 3.77–5.28)
SODIUM SERPL-SCNC: 142 MMOL/L (ref 136–145)
TRIGL SERPL-MCNC: 53 MG/DL (ref 0–150)
TSH SERPL DL<=0.05 MIU/L-ACNC: 2.06 UIU/ML (ref 0.27–4.2)
VIT B12 BLD-MCNC: >2000 PG/ML (ref 211–946)
VLDLC SERPL-MCNC: 11 MG/DL (ref 5–40)
WBC NRBC COR # BLD: 8.94 10*3/MM3 (ref 3.4–10.8)

## 2023-08-16 PROCEDURE — 80050 GENERAL HEALTH PANEL: CPT

## 2023-08-16 PROCEDURE — 82306 VITAMIN D 25 HYDROXY: CPT

## 2023-08-16 PROCEDURE — 80061 LIPID PANEL: CPT

## 2023-08-16 PROCEDURE — 83036 HEMOGLOBIN GLYCOSYLATED A1C: CPT

## 2023-08-16 PROCEDURE — 82607 VITAMIN B-12: CPT

## 2023-08-16 PROCEDURE — 36415 COLL VENOUS BLD VENIPUNCTURE: CPT

## 2023-08-17 DIAGNOSIS — I10 ESSENTIAL HYPERTENSION: Primary | ICD-10-CM

## 2023-08-17 RX ORDER — LOSARTAN POTASSIUM 50 MG/1
50 TABLET ORAL DAILY
Qty: 90 TABLET | Refills: 3 | Status: SHIPPED | OUTPATIENT
Start: 2023-08-17

## 2023-08-18 ENCOUNTER — OFFICE VISIT (OUTPATIENT)
Dept: FAMILY MEDICINE CLINIC | Facility: CLINIC | Age: 53
End: 2023-08-18
Payer: COMMERCIAL

## 2023-08-18 DIAGNOSIS — M79.644 FINGER PAIN, RIGHT: Primary | ICD-10-CM

## 2023-08-18 PROCEDURE — 99213 OFFICE O/P EST LOW 20 MIN: CPT | Performed by: FAMILY MEDICINE

## 2023-08-18 RX ORDER — CEPHALEXIN 500 MG/1
500 CAPSULE ORAL 2 TIMES DAILY
Qty: 14 CAPSULE | Refills: 0 | Status: SHIPPED | OUTPATIENT
Start: 2023-08-18 | End: 2023-08-25

## 2023-08-18 NOTE — PROGRESS NOTES
Per , Ms. Waller has been notified with recent lab results & recommendations via [employer].  Left message for patient to call me last date, she had an appointment with Dr. Clark today 08/18/2023  Continue current medications and follow-up as planned or sooner if any problems.

## 2023-08-22 ENCOUNTER — TELEPHONE (OUTPATIENT)
Dept: FAMILY MEDICINE CLINIC | Facility: CLINIC | Age: 53
End: 2023-08-22
Payer: COMMERCIAL

## 2023-08-22 NOTE — TELEPHONE ENCOUNTER
Per Dr. Clark, Ms. Waller has been called with recent Screening Mammogram results & recommendations.  Continue current medications and follow-up as planned or sooner if any problems.       ----- Message from Nisha Clark MD sent at 8/22/2023  8:37 AM CDT -----  Please call and let patient know that mammogram is normal.  Plan to repeat in 1 year.  Thanks, JING Clark

## 2023-09-04 NOTE — PROGRESS NOTES
Chief Complaint  finger pain    Subjective    History of Present Illness {CC  Problem List  Visit  Diagnosis   Encounters  Notes  Medications  Labs  Result Review Imaging  Media :23}     Josie Waller presents to [facility] - [place] for     Chief Complaint   Patient presents with    finger pain      Patient seen today for persistent finger pain x 3 weeks.  Was treated previously for possible cellulitis after urgent care visit.  Seemed to be feeling some better, however has once again become more tender to touch.  She notices tenderness with use at work.  No redness.  No known injury prior to pain initially.       Current Outpatient Medications:     Etonogestrel (NEXPLANON) 68 MG implant subdermal implant, Inject 1 each into the appropriate area of the skin as directed by provider 1 (One) Time., Disp: , Rfl:     fexofenadine (ALLEGRA) 180 MG tablet, Take 1 tablet by mouth Daily., Disp: , Rfl:     fluticasone (FLONASE) 50 MCG/ACT nasal spray, 2 sprays into the nostril(s) as directed by provider Daily., Disp: , Rfl:     halobetasol (ULTRAVATE) 0.05 % cream, Apply  topically to the appropriate area as directed Daily As Needed for Irritation., Disp: 50 g, Rfl: 0    hydroCHLOROthiazide (HYDRODIURIL) 12.5 MG tablet, Take 1 tablet by mouth once daily, Disp: 30 tablet, Rfl: 0    Insulin Pen Needle (Pen Needles) 31G X 6 MM misc, Use daily with saxenda, Disp: 30 each, Rfl: 5    Liraglutide (SAXENDA) 18 MG/3ML injection pen, Inject 0.6mg under skin daily for week one, THEN 1.2mg daily for week two, THEN 1.8mg daily for week three, then 2.4mg daily for week four., Disp: 15 mL, Rfl: 1    losartan (Cozaar) 50 MG tablet, Take 1 tablet by mouth Daily., Disp: 90 tablet, Rfl: 3    methylPREDNISolone (MEDROL) 4 MG dose pack, Take  by mouth 2 (Two) Times a Day. Take as directed on package instructions., Disp: , Rfl:     montelukast (SINGULAIR) 10 MG tablet, Take 1 tablet by  mouth Every Night., Disp: 90 tablet, Rfl: 3    multivitamin (THERAGRAN) tablet tablet, Take 1 tablet by mouth Daily., Disp: , Rfl:     naproxen (NAPROSYN) 500 MG tablet, TAKE 1 TABLET BY MOUTH TWICE DAILY WITH MEALS, Disp: 60 tablet, Rfl: 0     Allergies   Allergen Reactions    Lisinopril Cough    Latex      Hand irritation    Nickel      Hand irritation    Other Rash     Environmental-Rubber        Objective       Vital Signs:   There were no vitals taken for this visit.  Physical Exam  Musculoskeletal:      Right hand: Tenderness (tip of index finger, no erythema, mild swelling) present. Normal range of motion. Normal sensation.        Result Review :{ Labs  Result Review  Imaging  Med Tab  Media :23}   The following data was reviewed by: Nisha Clark MD on 08/18/2023    Common labs          8/16/2023    08:02   Common Labs   Glucose 100    BUN 16    Creatinine 0.70    Sodium 142    Potassium 4.0    Chloride 107    Calcium 9.1    Albumin 4.2    Total Bilirubin 0.3    Alkaline Phosphatase 59    AST (SGOT) 17    ALT (SGPT) 19    WBC 8.94    Hemoglobin 13.2    Hematocrit 38.6    Platelets 327    Total Cholesterol 171    Triglycerides 53    HDL Cholesterol 46    LDL Cholesterol  114    Hemoglobin A1C 5.90                Assessment and Plan {CC Problem List  Visit Diagnosis  ROS  Review (Popup)  Health Maintenance  Quality  BestPractice  Medications  SmartSets  SnapShot Encounters  Media :23}   Diagnoses and all orders for this visit:    1. Finger pain, right (Primary)  -     XR Finger 2+ View Right; Future  -     cephalexin (KEFLEX) 500 MG capsule; Take 1 capsule by mouth 2 (Two) Times a Day for 7 days.  Dispense: 14 capsule; Refill: 0       Persistent finger pain  Xray right finger today  Will call with results  Encouraged ice and epsom salt soaks  Needs continued monitoring for signs of infection  Wait and see antibiotic prescription  Keflex if needed for erythema, drainage or worsening  pain  Avoid aggravating activities      Follow Up {Instructions Charge Capture  Follow-up Communications :23}   Return if symptoms worsen or fail to improve, for Next scheduled follow up.  Patient was given instructions and counseling regarding her condition or for health maintenance advice. Please see specific information pulled into the AVS if appropriate.            This document has been electronically signed by Nisha Clark MD

## 2023-09-05 NOTE — PROGRESS NOTES
"Subjective   Josie Thea Waller is a 46 y.o. female.     History of Present Illness   Ms. Waller is a 45yo female that presents today follow-up on thyroid issues and she has been having elevated blood pressure. She has been trying to loose weight and get her health under control. She says that her BP has been slightly elevated several times.    She has a FH of hypothyroid and she has had issues with trying to loose weight.  She has had a hard time loosing weight.  Has had a lot fatigue. Says that she has been sleeping okay. Has been trying to live healthier diet.  Hasn'[t had change in her energy with the weight loss.      Current Outpatient Prescriptions:   •  CETIRIZINE HCL PO, Take  by mouth., Disp: , Rfl:   •  norgestimate-ethinyl estradiol (ORTHO TRI-CYCLEN LO) 0.18/0.215/0.25 MG-25 MCG per tablet, Take 1 tablet by mouth Daily., Disp: , Rfl:     The following portions of the patient's history were reviewed and updated as appropriate: allergies, current medications, past family history, past medical history, past social history, past surgical history and problem list.    Review of Systems   Constitutional: Positive for activity change and fatigue. Negative for appetite change and unexpected weight change.   Eyes: Negative for visual disturbance.   Respiratory: Negative for cough, shortness of breath and wheezing.    Cardiovascular: Negative for chest pain, palpitations and leg swelling.   Gastrointestinal: Negative for abdominal distention, abdominal pain, constipation, diarrhea, nausea and vomiting.   Musculoskeletal: Negative for arthralgias, back pain, gait problem and joint swelling.   Skin: Negative for pallor, rash and wound.   Neurological: Negative for headaches.   Psychiatric/Behavioral: Negative for dysphoric mood and sleep disturbance. The patient is not nervous/anxious.        Objective    Vitals:    06/12/17 1446 06/12/17 1505   BP: 148/70 138/70   Weight: 242 lb (110 kg)    Height: 64\" " (162.6 cm)        Physical Exam   Constitutional: She is oriented to person, place, and time. She appears well-developed and well-nourished. No distress.   Cardiovascular: Normal rate, regular rhythm and normal heart sounds.    No murmur heard.  No LE edema.   Pulmonary/Chest: Effort normal and breath sounds normal. No respiratory distress.   Abdominal: Soft. Bowel sounds are normal. She exhibits no distension. There is no tenderness.   Neurological: She is alert and oriented to person, place, and time.   Psychiatric: She has a normal mood and affect. Her behavior is normal. Judgment and thought content normal.   Nursing note and vitals reviewed.      Assessment/Plan   Problems Addressed this Visit        Digestive    Morbid obesity due to excess calories    Relevant Medications    hydrochlorothiazide (MICROZIDE) 12.5 MG capsule       Other    Elevated blood pressure (not hypertension)      Other Visit Diagnoses     Fatigue, unspecified type    -  Primary    Relevant Orders    TSH (Completed)    Vitamin B12 (Completed)    Lipid screening        High risk medication use        Relevant Orders    CBC (No Diff) (Completed)    Comprehensive Metabolic Panel (Completed)    Hemoglobin A1c (Completed)    Lipid Panel (Completed)    TSH (Completed)    Vitamin B12 (Completed)        1.) Fatigue-  Checking A1C, CBC, vitamin levels, TSH today.  Encouraged her to continue healthy living and weight loss.  She has had a lot on her mind recently with the anniversary of loosing her mother this month.    2.) Elevated BP-  Will start her on HCTZ low dose and that may help her leg swelling.  BP was better when rechecked here in the office.  Has been elevated several times before today. Will monitor closely.  3.) Obesity- Continue low carb, keto diet.  Will continue to encourage and monitor her weight loss.  RTC in 2-3 months or sooner PRN. Will do pap smear at that appointment.            abdominal pain

## 2023-09-13 ENCOUNTER — OFFICE VISIT (OUTPATIENT)
Dept: FAMILY MEDICINE CLINIC | Facility: CLINIC | Age: 53
End: 2023-09-13
Payer: COMMERCIAL

## 2023-09-13 DIAGNOSIS — M47.814 SPONDYLOSIS OF THORACIC REGION WITHOUT MYELOPATHY OR RADICULOPATHY: ICD-10-CM

## 2023-09-13 DIAGNOSIS — S62.639D CLOSED AVULSION FRACTURE OF DISTAL PHALANX OF FINGER WITH ROUTINE HEALING, SUBSEQUENT ENCOUNTER: Primary | ICD-10-CM

## 2023-09-13 PROCEDURE — 99213 OFFICE O/P EST LOW 20 MIN: CPT | Performed by: FAMILY MEDICINE

## 2023-09-13 RX ORDER — NAPROXEN 500 MG/1
TABLET ORAL
Qty: 60 TABLET | Refills: 0 | Status: SHIPPED | OUTPATIENT
Start: 2023-09-13

## 2023-09-15 ENCOUNTER — TELEPHONE (OUTPATIENT)
Dept: FAMILY MEDICINE CLINIC | Facility: CLINIC | Age: 53
End: 2023-09-15
Payer: COMMERCIAL

## 2023-09-15 DIAGNOSIS — N30.00 ACUTE CYSTITIS WITHOUT HEMATURIA: Primary | ICD-10-CM

## 2023-09-15 RX ORDER — NITROFURANTOIN 25; 75 MG/1; MG/1
100 CAPSULE ORAL 2 TIMES DAILY
Qty: 10 CAPSULE | Refills: 0 | Status: SHIPPED | OUTPATIENT
Start: 2023-09-15 | End: 2023-09-20

## 2023-09-15 NOTE — TELEPHONE ENCOUNTER
Possible UTI, requesting something be called in to [facility] if possible.    Urgency, some slight pain with urination, feels like she still has to go even when she has finished, some nausea.    Some chills off and on with hot sweaty feeling at times.     Been going on for a couple of days.  No Fever that she is aware of.     No visible blood, more yellow, she states she is usually clear.

## 2023-09-15 NOTE — TELEPHONE ENCOUNTER
Macrobid sent.  Urine needs to be tested if symptoms not improving with this medication in the next 48 hours.  Thanks, JING Clark

## 2023-09-18 ENCOUNTER — TELEPHONE (OUTPATIENT)
Dept: FAMILY MEDICINE CLINIC | Facility: CLINIC | Age: 53
End: 2023-09-18
Payer: COMMERCIAL

## 2023-09-18 ENCOUNTER — LAB (OUTPATIENT)
Dept: LAB | Facility: HOSPITAL | Age: 53
End: 2023-09-18
Payer: COMMERCIAL

## 2023-09-18 DIAGNOSIS — R30.0 DYSURIA: Primary | ICD-10-CM

## 2023-09-18 DIAGNOSIS — R30.0 DYSURIA: ICD-10-CM

## 2023-09-18 LAB
BILIRUB UR QL STRIP: NEGATIVE
CLARITY UR: CLEAR
COLOR UR: YELLOW
GLUCOSE UR STRIP-MCNC: NEGATIVE MG/DL
HGB UR QL STRIP.AUTO: NEGATIVE
KETONES UR QL STRIP: NEGATIVE
LEUKOCYTE ESTERASE UR QL STRIP.AUTO: NEGATIVE
NITRITE UR QL STRIP: NEGATIVE
PH UR STRIP.AUTO: 5.5 [PH] (ref 5–9)
PROT UR QL STRIP: NEGATIVE
SP GR UR STRIP: 1.01 (ref 1–1.03)
UROBILINOGEN UR QL STRIP: NORMAL

## 2023-09-18 PROCEDURE — 81003 URINALYSIS AUTO W/O SCOPE: CPT

## 2023-09-18 RX ORDER — FLUCONAZOLE 150 MG/1
150 TABLET ORAL ONCE
Qty: 1 TABLET | Refills: 0 | Status: SHIPPED | OUTPATIENT
Start: 2023-09-18 | End: 2023-09-18

## 2023-09-18 NOTE — TELEPHONE ENCOUNTER
Ms. Waller wants to go ahead and come in to have the Urinalysis completed. She said she is feeling some better but is going out of town on Vacation this weekend and does not want to have to worry if it has cleared up or not.     Please place order for a Urinalysis with possible culture

## 2023-09-18 NOTE — TELEPHONE ENCOUNTER
-Per Dr. Clark, Ms. Waller has been called with recent lab results & recommendations.  Continue current medications and follow-up as planned or sooner if any problems.     ---- Message from Nisha Clark MD sent at 9/18/2023  4:30 PM CDT -----  Urinalysis normal, no infection.  Thanks, JING Clark

## 2023-09-18 NOTE — TELEPHONE ENCOUNTER
Patient called.     Also she said she has a stomach virus over the weekend as well.     She is also asking if you would go ahead and send in Diflucan incase she develops a yeast infection following the antibiotics with her going out of town at the end of the week.     She uses [facility].

## 2023-09-28 NOTE — PROGRESS NOTES
Chief Complaint  avulsion fracture    Subjective    History of Present Illness {CC  Problem List  Visit  Diagnosis   Encounters  Notes  Medications  Labs  Result Review Imaging  Media :23}     Josie Waller presents to [facility] [place] for     Chief Complaint   Patient presents with    avulsion fracture     Patient seen today follow up avulsion fracture right index finger. She has been wearing finger splint.  Some improvement in pain.  Still some intermittent tenderness with certain activities.  Using ice as needed.         Current Outpatient Medications:     Etonogestrel (NEXPLANON) 68 MG implant subdermal implant, Inject 1 each into the appropriate area of the skin as directed by provider 1 (One) Time., Disp: , Rfl:     fexofenadine (ALLEGRA) 180 MG tablet, Take 1 tablet by mouth Daily., Disp: , Rfl:     fluticasone (FLONASE) 50 MCG/ACT nasal spray, 2 sprays into the nostril(s) as directed by provider Daily., Disp: , Rfl:     halobetasol (ULTRAVATE) 0.05 % cream, Apply  topically to the appropriate area as directed Daily As Needed for Irritation., Disp: 50 g, Rfl: 0    hydroCHLOROthiazide (HYDRODIURIL) 12.5 MG tablet, Take 1 tablet by mouth once daily, Disp: 30 tablet, Rfl: 0    Insulin Pen Needle (Pen Needles) 31G X 6 MM misc, Use daily with saxenda, Disp: 30 each, Rfl: 5    Liraglutide (SAXENDA) 18 MG/3ML injection pen, Inject 0.6mg under skin daily for week one, THEN 1.2mg daily for week two, THEN 1.8mg daily for week three, then 2.4mg daily for week four., Disp: 15 mL, Rfl: 1    losartan (Cozaar) 50 MG tablet, Take 1 tablet by mouth Daily., Disp: 90 tablet, Rfl: 3    methylPREDNISolone (MEDROL) 4 MG dose pack, Take  by mouth 2 (Two) Times a Day. Take as directed on package instructions., Disp: , Rfl:     montelukast (SINGULAIR) 10 MG tablet, Take 1 tablet by mouth Every Night., Disp: 90 tablet, Rfl: 3    multivitamin (THERAGRAN) tablet tablet,  "Take 1 tablet by mouth Daily., Disp: , Rfl:     naproxen (NAPROSYN) 500 MG tablet, TAKE 1 TABLET BY MOUTH TWICE DAILY WITH MEALS, Disp: 60 tablet, Rfl: 0     Objective       Vital Signs:   There were no vitals taken for this visit.    Physical Exam  Musculoskeletal:      Comments: Right finger, some tenderness with flexion DIP joint, no swelling, no bony tenderness      Result Review :{ Labs  Result Review  Imaging  Med Tab  Media :23}   The following data was reviewed by: Nisha Clark MD on 09/13/2023    Common labs          8/16/2023    08:02   Common Labs   Glucose 100    BUN 16    Creatinine 0.70    Sodium 142    Potassium 4.0    Chloride 107    Calcium 9.1    Albumin 4.2    Total Bilirubin 0.3    Alkaline Phosphatase 59    AST (SGOT) 17    ALT (SGPT) 19    WBC 8.94    Hemoglobin 13.2    Hematocrit 38.6    Platelets 327    Total Cholesterol 171    Triglycerides 53    HDL Cholesterol 46    LDL Cholesterol  114    Hemoglobin A1C 5.90       Xray Finger 8/18/2023  \"IMPRESSION:  Findings/impression:  There is a tiny bony fragment dorsal aspect of the base of the distal phalanx,  which could represent a tiny avulsion fracture at the insertion of the extensor  mechanism.  No additional fracture identified.  The alignment is anatomic.\"           Assessment and Plan {CC Problem List  Visit Diagnosis  ROS  Review (Popup)  Health Maintenance  Quality  BestPractice  Medications  SmartSets  SnapShot Encounters  Media :23}   Diagnoses and all orders for this visit:    1. Closed avulsion fracture of distal phalanx of finger with routine healing, subsequent encounter (Primary)  -     XR Finger 2+ View Right; Future      Continue conservative measures  Wear splint at work for 2 additional weeks, can remove at home  If no tenderness at that time can trial without splint at work  Repeat Xray of finger in 2-4 weeks - order placed       Follow Up {Instructions Charge Capture  Follow-up Communications :23} "   Return if symptoms worsen or fail to improve, for Next scheduled follow up.  Patient was given instructions and counseling regarding her condition or for health maintenance advice. Please see specific information pulled into the AVS if appropriate.            This document has been electronically signed by Nisha Clark MD

## 2023-10-16 ENCOUNTER — E-VISIT (OUTPATIENT)
Dept: FAMILY MEDICINE CLINIC | Facility: TELEHEALTH | Age: 53
End: 2023-10-16
Payer: COMMERCIAL

## 2023-10-17 NOTE — E-VISIT TREATED
Chief Complaint: Rashes and other skin conditions   Patient introduction   Patient is 52-year-old female presenting with nonpainful, nonpruritic rash on their right foot for 1 to 2 weeks.   General presentation   Patient has not had any recent cold symptoms. No fever, chills, myalgia, nausea, vomiting, diarrhea, headache, or fatigue/lethargy.   The following treatments were not helpful for current rash symptoms:    Non-prescription antifungal cream   Patient has no previous history of a similar rash.   Insect bites: No known recent insect exposure.   Chickenpox: Has had chickenpox.   Scabies: No recent scabies exposure.   Impetigo: No recent impetigo exposure.   Ticks: Patient has not recently spent significant time outdoors. In previous month, patient has not spent any time in regions with a high risk of tick-borne disease.   Appearance or location of rash does not change throughout the course of a day.   Rash has not spread to a new location.   No herald patch prior to onset of rash.   Rash is not in an area that is regularly shaved. Patient does not participate in contact sports. Patient does not work in a school or childcare facility.   No history of prior MRSA infection.   No known aggravators.   Review of red flags/alarm symptoms:    No systemic symptoms    No symptoms of anaphylactic reaction    No swollen lymph nodes    No recent history suggesting adverse drug rash (no new medication, herb, or supplement)   Pregnancy/breastfeeding: Patient is menopausal.   Current medications   Currently taking fluticasone 50 MCG/ACT nasal spray, naproxen 500 MG tablet, Etonogestrel 68 MG implant subdermal implant, montelukast 10 MG tablet, halobetasol 0.05 % cream, losartan 50 MG tablet, and hydroCHLOROthiazide 12.5 MG tablet.   Medication allergies   No relevant drug allergies.   Medication contraindication review   Patient is currently taking an ARB. Therefore, medications containing trimethoprim will not be  prescribed.   No cerebral malaria, CHF, cutaneous cpzhf-wgudzj-ylbo disease, folate deficiency, G6PD deficiency (or breastfeeding a child with G6PD deficiency), generalized erythroderma, liver disease, lamellar ichthyosis, malignancy or premalignancy at the affected site, megaloblastic anemia, mononucleosis, Netherton syndrome, peripheral neuropathy, porphyria, QT prolongation, congenital long QT syndrome, skin barrier defect condition, systemic mycoses, TMP/SMX-associated thrombocytopenia, thyroid dysfunction, or ulcerative colitis.   No history of myasthenia gravis, aortic aneurysm or dissection, Marfan syndrome, or Deo-Danlos syndrome.   Past medical history   Immune conditions: No immunocompromising conditions.   No history of cancer.   Patient-submitted comments   Patient was asked if they had anything to add about their symptoms. Patient writes: I have been using tea tree oil while at home on my toe nail and an antifungal polish (otc) in the morning while at work. It seems slightly better at times, but then looks the same at others..   Patient did not request an excuse note.   Assessment   Tinea pedis (Athlete's foot).   This is the likely diagnosis based on interview responses and patient-submitted photos, including:    Symptom profile   Plan   Medications:    terbinafine HCl 250 mg tablet RX 250mg 1 tab PO qd 14d until the full prescribed amount is finished. Amount is 14 tab.   The patient's prescription will be sent to:   [facility] [id number]   [facility] [address] [place] [address]   Phone: [phone]     Fax: [phone]   Education:    Condition and causes    Prevention    Treatment and self-care    When to call provider   ----------   Electronically signed by SONI Masterson FNP-BC on 2023-10-16 at 21:24PM   ----------   Patient Interview Transcript:   Where is the affected area located? Select all that apply.    Foot or in between toes   Not selected:    Scalp    Face    Inside mouth or  on lips    Neck    Arm    Hand    Chest    Stomach    Back    Buttocks    Groin    Leg    None of the above   Which foot is bothering you? Select one.    Right   Not selected:    Left    Both   Before your skin symptoms appeared, were you exposed to any of these possible triggers? Select all that apply.    None of the above   Not selected:    Tick bite    Other insect bite or sting in the affected area    Dog or cat bite    Cut, scrape, or other skin injury in the affected area    Contact with poison oak, poison ivy, or poison sumac in the affected area    Contact with a new soap, perfume, skincare product, cleaning product, or other chemical in the affected area    Wearing new jewelry, belt buckle, or other metal accessory in the affected area    Taking a new medication, supplement, or herb    Consuming a new food or drink    Vaccine injection    Exposure to extreme hot or cold temperatures    Exercising intensely    Sitting in a hot tub or swimming in a heated swimming pool    Wearing ill-fitting shoes or socks for a long time    Contact with someone who has a similar rash    Contact with someone who has impetigo    Contact with someone who has scabies    Other (specify)   Have you ever had chickenpox? Select one.    Yes   Not selected:    No    I'm not sure   Is the affected skin in an area that you shave regularly? Select one.    No   Not selected:    Yes   Does the appearance or location of your skin symptoms change throughout the course of a day? For example, does it appear on one part of your body in the morning, disappear completely after several hours, and then reappear on a different part of your body? Select one.    No   Not selected:    Yes   Since your skin symptoms first appeared, have they spread or shown up in new locations? This includes covering a larger area than they first did, or spreading to another part of the body. Select one.    No   Not selected:    Yes   Which of these describe the  affected area? Select all that apply.    None of the above   Not selected:    Itchy    Painful    Warmer than other areas of my skin   How long have you had these current skin symptoms? Select one.    1 to 2 weeks   Not selected:    Less than 24 hours    24 to 48 hours    2 to 3 days    3 to 5 days    5 to 7 days    More than 2 weeks   Do any of these make your symptoms worse? Select all that apply.    None of the above   Not selected:    Alcohol    Exercise    Exposure to very hot or very cold temperature    Certain foods    Changes in weather    , soaps, or detergents    Hot beverages    Spicy foods    Stress or strong emotions (such as anger or embarrassment)    Sun exposure    Sweat    Wool in clothing or blankets   To recommend the best treatment for you, we need to see photos of the affected area.   [image: [email or web address]]   Close-up detail (for size, shape, and color)   [image: [email or web address]]   Surrounding area (to compare with healthy skin)   [image: [email or web address]]   Affected area at a distance (for scale and location)   If you choose not to send photos, you'll need to speak with a provider to get care.    Select one.    OK, I'll send photos.   Not selected:    I'd rather not send photos. Show me my care options.   Send at least 3 photos for review - Don't use a flash. - Make sure the photos are in focus. - Take a close-up photo (for size, shape, color). - Take a photo showing surrounding area (to compare with healthy skin). - Take a photo with a quarter coin or ruler near the affected area to show scale. - If more than one location is affected, repeat for each location.    Upload 1    Upload 2    Upload 3   Not selected:    Upload 4    Upload 5   A rash can be a sign of a more serious condition. These conditions may need in-person care for an exam or lab tests. Along with your skin symptoms, have you had any of these symptoms? Select  all that apply.    None of these   Not selected:    Fever    Chills    Body aches or muscle aches    Nausea    Vomiting    Diarrhea    Headache    Fatigue, exhaustion, or lethargy (feeling drowsy, dull, or low energy)   Have you had swollen lymph nodes? Swollen lymph nodes are small lumps under the skin that are soft, tender, and often painful. They may be noticed in the neck, the armpits, or the groin. Select one.    No, not that I've noticed   Not selected:    Yes   Along with your skin symptoms, have you had any of these symptoms? Select all that apply.    None of these   Not selected:    Chest pains or rapid heartbeat    Shortness of breath or wheezing    Swelling of lips or tongue    Throat tightness or hoarse voice    Stomach cramps, diarrhea, or vomiting    Confusion or dizziness   Have you recently had any cold symptoms (runny nose, nasal congestion, cough, or sore throat)? Select one.    No   Not selected:    Yes   Before the rash appeared, did you see a large, round patch on your chest, stomach, or back? This patch is usually 1 to 4 inches across, dry, and itchy. It often appears a few days to a few weeks before the rash. Select one.    No   Not selected:    Yes   Were you recently exposed to any insects? For example: - Do you have any household pets that may have fleas? - Have you noticed an increase in spiders, either indoors or outdoors? - Have you slept where bedbugs may be present? - Have you hiked, camped, or gardened where mosquitoes may have been present?    No, not that I know of   Not selected:    Yes   In the last month, did you spend a lot of time outdoors, especially in wooded areas with brushy fields or tall grasses? Select one.    No   Not selected:    Yes   In the last month, have you been to any of these states? Scroll to see all options. Select all that apply.    No   Not selected:    [facility]  [facility]   Have you had these skin symptoms before? Select one.    No   Not selected:    At least once before    Many times before    I'm not sure   Do you or does anyone in your family have a history of allergies (hay fever, seasonal allergies), eczema, or asthma? Select all that apply.    No, not that I know of   Not selected:    Yes, I do    Yes, a family member does   Have you ever been treated for a MRSA (methicillin-resistant Staphylococcus aureus) infection? MRSA is a type of bacteria that's resistant to many commonly used antibiotics. Select one.    No, not that I know of   Not selected:    Yes   Do you have any of these conditions? Scroll to see all options. Select all that apply.    None of the above   Not selected:    Cerebral malaria    Congenital long QT syndrome    Folate deficiency    Systemic fungal infection, such as invasive candidiasis    G6PD deficiency, or breastfeeding a child with G6PD deficiency    Infection at the affected area    Megaloblastic anemia    Mono (mononucleosis)    Decreased sensation in feet (peripheral neuropathy)    Porphyria    Skin cancer or pre-malignancy at the affected area    A serious skin condition (skin barrier defect condition, Netherton syndrome, lamellar ichthyosis, generalized erythroderma, or cutaneous pdnul-hyxlaa-hqkq disease)    QT prolongation    Thyroid dysfunction    Ulcerative colitis   Do you have any of these conditions that can affect the immune system? Scroll to see all options. Select all that apply.    None of these   Not selected:    History of bone marrow transplant    Chronic kidney disease    Chronic liver disease (including cirrhosis)    HIV/AIDS    Inflammatory bowel disease (Crohn's disease or ulcerative colitis)    Lupus    Moderate to severe plaque psoriasis    Multiple sclerosis    Rheumatoid arthritis    Sickle cell anemia     Alpha or beta thalassemia    History of solid organ transplant (kidney, liver, or heart)    History of spleen removal    An autoimmune disorder not listed here (specify)    A condition requiring treatment with long-term use of oral steroids (such as prednisone, prednisolone, or dexamethasone) (specify)   Have you ever been diagnosed with cancer? Select one.    No   Not selected:    Yes, I have cancer now    Yes, but I'm in remission   Do you have any of these conditions? Scroll to see all options. Select all that apply.    None of the above   Not selected:    Myasthenia gravis    History of aortic aneurysm or dissection    Marfan syndrome    Deo-Danlos syndrome   Are you currently being treated for type 1 or type 2 diabetes? Select one.    No   Not selected:    Yes   Do you play sports or take part in activities with skin-to-skin contact? Select one.    No   Not selected:    Yes   Do you work in a school or childcare center? Select one.    No   Not selected:    Yes   Have you gone through menopause? Select one.    I'm going through it now   Not selected:    Yes    No   Have you tried any treatments for your current symptoms? Select one.    Yes   Not selected:    No   A non-prescription antifungal cream    Not helpful   Not selected:    Helpful   Are you taking any of these medications? Select all that apply.    An angiotensin II receptor blocker (ARB), such as candesartan, irbesartan, losartan, or valsartan   Not selected:    An ACE inhibitor, such as lisinopril, enalapril, captopril, or benazepril    Kynmobi or Apokyn (apomorphine)    No   Are you still taking these medications listed in your medical record? If you're not taking any of these, click Next. Select all that apply.    fluticasone 50 MCG/ACT nasal spray    naproxen 500 MG tablet    Etonogestrel 68 MG implant subdermal implant    montelukast 10 MG tablet    halobetasol 0.05 % cream    losartan 50 MG tablet    hydroCHLOROthiazide 12.5 MG tablet   Are  "you taking any other medications, vitamins, or supplements? Select one.    No   Not selected:    Yes   Have you ever had an allergic or bad reaction to any medication? Select one.    Yes   Not selected:    No   Have you had an allergic or bad reaction to any of these antibiotic medications? Select all that apply.    No, not that I know of   Not selected:    Penicillin or any \"-cillin\" antibiotic, such as amoxicillin, ampicillin, dicloxacillin, nafcillin, or piperacillin (Brands include Augmentin, Unasyn, and Zosyn)    Tetracycline or any \"-cycline\" antibiotic, such as doxycycline, demeclocycline, or minocycline (Brands include Doryx, Dynacin, Oracea, Monodox, and Vibramycin)    Ciprofloxacin or any \"-floxacin\" antibiotic, such as gemifloxacin, levofloxacin, moxifloxacin, or ofloxacin (Brands include Factive, Cipro, Floxin, and Levaquin)    Cephalexin or any \"cef-\" antibiotic, such as cefazolin, cefdinir, cefuroxime, ceftriaxone, ceftazidime, or cefepime (Brands include Fortaz and Keflex)    Clindamycin or lincomycin (Brands include Cleocin and Lincocin)    Sulfa drugs, such as sulfamethoxazole, sulfisoxazole, sulfasalazine, or dapsone (Brands include Aczone, Bactrim, and Septra)   Have you had an allergic or bad reaction to any of these medications? Select all that apply.    No, not that I know of   Not selected:    Antifungals, such as clotrimazole, fluconazole, ketoconazole, miconazole, or itraconazole, (Diflucan, Extina, Lotrimin-AF, Micatin, Onmel, Zeasorb)    Antiparasitics, such as Permethrin or lindane (Nix)    Antivirals, such as acyclovir, penciclovir, or valacyclovir (Valtrex, Zovirax)    Griseofulvin (Isabelle-PEG)    Terbinafine (Lamisil)   Have you had an allergic or bad reaction to any corticosteroids? - Examples of topical corticosteroids include: hydrocortisone (Cortizone), clobetasol (Temovate, Cormax), betamethasone (Diprolene), fluocinonide (Vanos), desonide (Desowen, Desonate, Tridesilon), " triamcinolone (Kenalog, Trianex), alclometasone, and mometasone (Elocon). - Examples of oral corticosteroids include: prednisone and methylprednisolone (Medrol). Select one.    No, not that I know of   Not selected:    Yes   Have you had an allergic or bad reaction to any of these topical medications? A topical medication is a cream, gel, or ointment that's put on the skin. Select all that apply.    No, not that I know of   Not selected:    Mupirocin (Bactroban)    Topical retinoids, such as adapalene, azelaic acid, tazarotene, or tretinoin (Azelex, Differin, Finacea, or Tazorac)    Pimecrolimus or tacrolimus (Elidel or Protopic)    Crisaborole (Eucrisa)   Have you had an allergic or bad reaction to any of these medications? Select all that apply.    No, not that I know of   Not selected:    Acetaminophen (Tylenol)    Ibuprofen (Advil, Motrin, Midol)    Diphenhydramine (Benadryl)    Cetirizine (Zyrtec)    Hydroxyzine pamoate (Vistaril)    Loratadine (Alavert, Claritin)    Fexofenadine (Allegra)    Ammonium lactate lotion (Amlactin, Lac-Hydrin, Laclotion, Ultravate)    Salicylic acid cream (Salacyn, Salex)    Urea cream (Aqua Care, Nutraplus)    Calcium acetate/aluminum sulfate (Domeboro)   Have you had an allergic or bad reaction to ondansetron (Zuplenz, Zofran ODT, Zofran)? Select one.    No, not that I know of   Not selected:    Yes   Have you ever had jaundice or liver problems as a result of taking amoxicillin-clavulanate (Augmentin)? Jaundice is a condition in which the skin and the whites of the eyes turn yellow. Select all that apply.    No, not that I know of   Not selected:    Yes, jaundice    Yes, liver problems   Do you need a doctor's note? A doctor's note confirms that you received care today and states when you can return to school or work. It does not contain information about your diagnosis or treatment plan. Your provider will make the final decision on whether to give you a doctor's note. Doctor's  notes cannot be backdated. Select one.    No   Not selected:    Today only (1 day)    Today and tomorrow (2 days)    3 days   Is there anything you'd like to add about your symptoms? Please limit your comments to the symptoms asked about in this interview. If you include comments about other concerns, your provider may recommend that you be seen in person.    I have been using tea tree oil while at home on my toe nail and an antifungal polish (otc) in the morning while at work. It seems slightly better at times, but then looks the same at others.   ----------   Medical history   Medical history data does not currently exist for this patient.

## 2023-10-17 NOTE — EXTERNAL PATIENT INSTRUCTIONS
Note   I have precribed Terbinafine. If your symptoms do not improve or become worse, follow up with your doctor.   Diagnosis   Athlete's foot (tinea pedis)   My name is SONI Masterson FNP-BC, and I'm a healthcare provider at [facility]. After reviewing your responses and photos, I see that you have tinea pedis, commonly known as athlete's foot. This is a common condition that affects millions of people every year.   Medications   Your pharmacy   [facility] [id number] [facility] [address] [phone]     Prescription   Terbinafine oral tablet (250mg): Take 1 tablet by mouth once a day for 14 days until the full prescribed amount is finished.   About your diagnosis   Athlete's foot is an infection of the skin caused by a fungus. This rash is often found on the soles of feet and in the spaces between toes. It can also spread to hands, fingers, and the groin.   Athlete's foot is highly contagious. The fungus that causes this rash grows in warm, moist environments, such as gyms, shower stalls, locker rooms, swimming pools, and nail salons. Direct person-to-person contact can spread the fungus and cause the rash. Contact with contaminated surfaces, socks, shoes, and towels can also lead to athlete's foot.   Common signs and symptoms of athlete's foot include:    Red, itchy skin on your foot and/or in between your toes    Skin that's cracking, peeling, or blistering    Burning or stinging pain at the site of the rash    Blisters that ooze or crust over   What to expect   Athlete's foot usually responds well to treatment. If you follow the treatments recommended here, it should clear up in 4 weeks. If you get athlete's foot often, consider using an antifungal powder, cream, or spray in between your toes every day.   When to seek care   Call us at [phone]   with any sudden or unexpected symptoms.    Your foot becomes swollen and warm to the touch    Signs of infection, such as  fever, red streaks around your rash, or drainage of pus    Symptoms are still present after finishing the course of treatment   Prevention   It's quite common for athlete's foot to come back. Take these steps to help prevent it from returning:    Keep your feet and the area between your toes dry. Go barefoot as much as you can to let things air out.    Wash your feet with soap and water at least twice a day. Make sure they're completely dry before putting on socks and shoes.    Wear clean cotton socks. To keep your feet dry, change your socks often.    Wear shoes that are lightweight and well-ventilated. Avoid shoes made out of materials like rubber, plastic, or vinyl. Dry out moist or sweaty shoes before wearing them again.    Wear shower shoes or flip-flops when at public swimming pools and showers.    Use an antifungal or drying powder on your feet (Lotrimin, Lamisil, or Gold Bond Medicated Foot Powder).   Your provider   Your diagnosis was provided by SONI Masterson, LINDAP-BC, a member of your trusted care team at [facility].   If you have any questions, call us at [phone]  .

## 2023-12-28 NOTE — TELEPHONE ENCOUNTER
Pr Dr. JESENIA Mccoy, Ms. Waller has been called with her recent lab results & recommendations.  Continue her current medications and follow-up as planned or sooner if any problems.      ----- Message from Aziza Mccoy MD sent at 12/13/2017  3:15 PM CST -----  Please let her know that her vitamin d looks normal.  Her sugars are still slightly elevated but looking much better.  Continue working on diet and will see her at follow-up as planned.     6